# Patient Record
Sex: MALE | Race: WHITE
[De-identification: names, ages, dates, MRNs, and addresses within clinical notes are randomized per-mention and may not be internally consistent; named-entity substitution may affect disease eponyms.]

---

## 2021-09-19 ENCOUNTER — HOSPITAL ENCOUNTER (INPATIENT)
Dept: HOSPITAL 56 - MW.ED | Age: 66
LOS: 3 days | Discharge: HOME | DRG: 195 | End: 2021-09-22
Attending: INTERNAL MEDICINE | Admitting: INTERNAL MEDICINE
Payer: COMMERCIAL

## 2021-09-19 DIAGNOSIS — Z90.5: ICD-10-CM

## 2021-09-19 DIAGNOSIS — Z23: ICD-10-CM

## 2021-09-19 DIAGNOSIS — M54.5: ICD-10-CM

## 2021-09-19 DIAGNOSIS — Z79.899: ICD-10-CM

## 2021-09-19 DIAGNOSIS — M54.6: ICD-10-CM

## 2021-09-19 DIAGNOSIS — Z90.49: ICD-10-CM

## 2021-09-19 DIAGNOSIS — Z20.822: ICD-10-CM

## 2021-09-19 DIAGNOSIS — Z96.659: ICD-10-CM

## 2021-09-19 DIAGNOSIS — E11.9: ICD-10-CM

## 2021-09-19 DIAGNOSIS — J18.9: Primary | ICD-10-CM

## 2021-09-19 DIAGNOSIS — N28.9: ICD-10-CM

## 2021-09-19 DIAGNOSIS — I10: ICD-10-CM

## 2021-09-19 DIAGNOSIS — I48.0: ICD-10-CM

## 2021-09-19 LAB
BUN SERPL-MCNC: 34 MG/DL (ref 7–18)
CHLORIDE SERPL-SCNC: 96 MMOL/L (ref 98–107)
CO2 SERPL-SCNC: 20.1 MMOL/L (ref 21–32)
GLUCOSE SERPL-MCNC: 244 MG/DL (ref 74–106)
LIPASE SERPL-CCNC: 74 U/L (ref 73–393)
POTASSIUM SERPL-SCNC: 5.5 MMOL/L (ref 3.5–5.1)
SODIUM SERPL-SCNC: 131 MMOL/L (ref 136–148)

## 2021-09-19 PROCEDURE — G0009 ADMIN PNEUMOCOCCAL VACCINE: HCPCS

## 2021-09-19 PROCEDURE — U0002 COVID-19 LAB TEST NON-CDC: HCPCS

## 2021-09-19 RX ADMIN — DEXTROSE SCH UNITS: 10 SOLUTION INTRAVENOUS at 20:44

## 2021-09-19 NOTE — CT
Indication:



 Elevated white count. 



Technique:



 Noncontrast CT abdomen and pelvis 



Comparison:



 No comparison 



Findings:



 Heart size normal. No pericardial effusion. No pericardial effusion. Left 

basilar airspace consolidation. 



Small effusion. Unenhanced liver gallbladder pancreas adrenal glands are 

unremarkable. Spleen unremarkable no abdominal aortic aneurysm. Right 

nephrectomy. Left kidney unremarkable 



Prostate gland unremarkable. Urinary bladder decompressed with wall 

thickening. Abundant stool in the colon 



No obstruction 



Small fat containing inguinal hernias. 



No suspicious bony lesions. Old left posterior rib fracture 



Impression:



 1. Left basilar dense airspace consolidation may represent pneumonia. 



2. No acute findings in the abdomen or pelvis.



Please note that all CT scans at this facility use dose modulation, 

iterative reconstruction, and/or weight-based dosing when appropriate to 

reduce radiation dose to as low as reasonably achievable.



Dictated by Jessica Morales MD @ 9/19/2021 3:33:42 PM



(Electronically Signed)

## 2021-09-19 NOTE — CR
INDICATION:



Cough. Bilateral rhonchi.



TECHNIQUE:



AP portable chest.



COMPARISON:



None.



FINDINGS:



Clear lungs. Normal heart size and pulmonary vascularity. Old posterior 

left rib fractures.



IMPRESSION:



No acute cardiopulmonary process identified.



Dictated by Josesito Castro MD @ 9/19/2021 12:45:05 PM



(Electronically Signed)

## 2021-09-19 NOTE — EDM.PDOC
ED HPI GENERAL MEDICAL PROBLEM





- General


Chief Complaint: Respiratory Problem


Stated Complaint: COUGHING, CHEST CONGESTION


Time Seen by Provider: 09/19/21 11:46


Source of Information: Reports: Patient


History Limitations: Reports: No Limitations





- History of Present Illness


INITIAL COMMENTS - FREE TEXT/NARRATIVE: 





Patient is a 66-year-old male history of diabetes who presents today for what he

describes flulike symptoms.  He states he has diffuse body aches dry cough and 

fatigue.  Symptoms have been present for 2 days.  He has not take any medication

for this at home.  Denies any chest pain abdominal pain states he still 

tolerating p.o.


  ** Bilateral Chest


Pain Score (Numeric/FACES): 4





- Related Data


                                    Allergies











Allergy/AdvReac Type Severity Reaction Status Date / Time


 


No Known Allergies Allergy   Verified 09/19/21 11:48











Home Meds: 


                                    Home Meds





Buprenorphine 15 mg DAILY 09/19/21 [History]


Cholecalciferol (Vitamin D3) [Vitamin D3] 1 dose PO DAILY 09/19/21 [History]


Citalopram [Citalopram HBr] 20 mg PO DAILY 09/19/21 [History]


Dulaglutide [Trulicity] 3 mg PO DAILY 09/19/21 [History]


Fluticasone Propionate [24 Hour Allergy] 1 dose PO DAILY 09/19/21 [History]


Lactobacillus Acidophilus [Acidophilus Probiotic] 1 dose PO DAILY 09/19/21 

[History]


Melatonin 10 mg PO DAILY 09/19/21 [History]


Metoprolol Succinate 50 mg PO DAILY 09/19/21 [History]


Omeprazole Magnesium 20 mg PO DAILY 09/19/21 [History]


Ubidecarenone [Coenzyme Q-10] 1 dose PO DAILY 09/19/21 [History]


atorvaSTATin [Lipitor] 20 mg PO DAILY 09/19/21 [History]


glipiZIDE [Glucotrol XL] 10 mg PO DAILY 09/19/21 [History]


lisinopriL [Lisinopril] 2.5 mg PO DAILY 09/19/21 [History]


traZODone 50 mg PO DAILY 09/19/21 [History]











ED ROS GENERAL





- Review of Systems


Review Of Systems: See Below


Constitutional: Reports: Malaise, Fatigue


HEENT: Reports: No Symptoms


Respiratory: Reports: Cough


Cardiovascular: Reports: No Symptoms


Endocrine: Reports: No Symptoms


GI/Abdominal: Reports: No Symptoms


: Reports: No Symptoms


Musculoskeletal: Reports: No Symptoms


Skin: Reports: No Symptoms


Neurological: Reports: No Symptoms


Psychiatric: Reports: No Symptoms


Hematologic/Lymphatic: Reports: No Symptoms


Immunologic: Reports: No Symptoms





ED EXAM, GENERAL





- Physical Exam


Exam: See Below


Exam Limited By: No Limitations


General Appearance: Alert, WD/WN, No Apparent Distress


Respiratory/Chest: No Respiratory Distress, Rhonchi


Cardiovascular: Normal Peripheral Pulses, Regular Rate, Rhythm


GI/Abdominal: Normal Bowel Sounds, Soft, Non-Tender


Back Exam: Normal Inspection


Extremities: Normal Inspection, Normal Range of Motion


Neurological: Alert, Oriented, Normal Cognition, Normal Gait





Course





- Vital Signs


Last Recorded V/S: 


                                Last Vital Signs











Temp  97.8 F   09/19/21 11:59


 


Pulse  84   09/19/21 14:21


 


Resp  16   09/19/21 14:21


 


BP  137/90   09/19/21 14:21


 


Pulse Ox  97   09/19/21 14:21














- Orders/Labs/Meds


Orders: 


                               Active Orders 24 hr











 Category Date Time Status


 


 Patient Status [ADT] Routine ADT  09/19/21 15:48 Ordered


 


 CULTURE BLOOD [BC] Stat Lab  09/19/21 15:39 Ordered


 


 CULTURE BLOOD [BC] Stat Lab  09/19/21 15:39 Ordered


 


 LACTIC ACID SEPSIS W/ REFLEX [LACTATE SEPSIS W/ REFLEX] Lab  09/19/21 15:22 

Received





 [CHEM] Routine   


 


 Azithromycin [Zithromax] 500 mg Med  09/19/21 15:45 Active





 Sodium Chloride 0.9% [Normal Saline (AdvBag)] 250 ml   





 IV ONETIME   


 


 Sodium Chloride 0.9% [Normal Saline] 1,000 ml Med  09/19/21 13:30 Active





 IV ASDIRECTED   


 


 Blood Culture x2 Reflex Set [OM.PC] Stat Oth  09/19/21 15:39 Ordered








                                Medication Orders





Sodium Chloride (Normal Saline)  1,000 mls @ 1,000 mls/hr IV ASDIRECTED KYLE


   Last Admin: 09/19/21 13:40  Dose: 1,000 mls/hr


   Documented by: JOSE


Azithromycin 500 mg/ Sodium (Chloride)  250 mls @ 250 mls/hr IV ONETIME KYLE








Labs: 


                                Laboratory Tests











  09/19/21 09/19/21 09/19/21 Range/Units





  12:11 12:11 12:11 


 


WBC  36.64 H    (4.0-11.0)  K/uL


 


RBC  4.63    (4.50-5.90)  M/uL


 


Hgb  14.6    (13.0-17.0)  g/dL


 


Hct  41.0    (38.0-50.0)  %


 


MCV  88.6    (80.0-98.0)  fL


 


MCH  31.5    (27.0-32.0)  pg


 


MCHC  35.6    (31.0-37.0)  g/dL


 


RDW Std Deviation  43.6    (28.0-62.0)  fl


 


RDW Coeff of Arpit  14    (11.0-15.0)  %


 


Plt Count  309    (150-400)  K/uL


 


MPV  9.30    (7.40-12.00)  fL


 


Add Manual Diff  YES    


 


Neutrophils % (Manual)  74    (48.0-80.0)  %


 


Band Neutrophils %  14    %


 


Lymphocytes % (Manual)  7 L    (16.0-40.0)  %


 


Monocytes % (Manual)  5    (0.0-15.0)  %


 


Nucleated RBC %  0.1    /100WBC


 


Absolute Seg Neuts  27.1 H    (1.4-5.7)  


 


Band Neutrophils #  5.1    


 


Lymphocytes # (Manual)  2.6 H    (0.6-2.4)  


 


Monocytes # (Manual)  1.8 H    (0.0-0.8)  


 


Nucleated RBCs #  0    K/uL


 


INR    1.11  


 


APTT    27.2  (18.6-31.3)  SEC


 


Fibrinogen    430 H  (215-411)  mg/dL


 


Sodium   131 L   (136-148)  mmol/L


 


Potassium   5.5 H   (3.5-5.1)  mmol/L


 


Chloride   96 L   ()  mmol/L


 


Carbon Dioxide   20.1 L   (21.0-32.0)  mmol/L


 


BUN   34 H   (7.0-18.0)  mg/dL


 


Creatinine   3.0 H   (0.8-1.3)  mg/dL


 


Est Cr Clr Drug Dosing   25.35   mL/min


 


Estimated GFR (MDRD)   21.1   ml/min


 


Glucose   244 H   ()  mg/dL


 


Calcium   9.3   (8.5-10.1)  mg/dL


 


Total Bilirubin   1.1 H   (0.2-1.0)  mg/dL


 


AST   9 L   (15-37)  IU/L


 


ALT   27   (14-63)  IU/L


 


Alkaline Phosphatase   69   ()  U/L


 


Troponin I   < 0.050   (0.000-0.056)  ng/mL


 


Total Protein   7.7   (6.4-8.2)  g/dL


 


Albumin   4.0   (3.4-5.0)  g/dL


 


Globulin   3.7   (2.6-4.0)  g/dL


 


Albumin/Globulin Ratio   1.1   (0.9-1.6)  


 


Lipase   74   ()  U/L


 


Urine Color     


 


Urine Appearance     


 


Urine pH     (5.0-8.0)  


 


Ur Specific Gravity     (1.001-1.035)  


 


Urine Protein     (NEGATIVE)  mg/dL


 


Urine Glucose (UA)     (NEGATIVE)  mg/dL


 


Urine Ketones     (NEGATIVE)  mg/dL


 


Urine Occult Blood     (NEGATIVE)  


 


Urine Nitrite     (NEGATIVE)  


 


Urine Bilirubin     (NEGATIVE)  


 


Urine Urobilinogen     (<2.0)  EU/dL


 


Ur Leukocyte Esterase     (NEGATIVE)  


 


U Hyaline Cast (Auto)     (0-2/LPF)  


 


Urine RBC     (0-2/HPF)  


 


Urine WBC     (0-5/HPF)  


 


Ur Epithelial Cells     (NONE-FEW)  


 


Amorphous Sediment     (NEGATIVE)  


 


Urine Bacteria     (NEGATIVE)  


 


Urine Mucus     (NONE-MOD)  


 


SARS-CoV-2 RNA (GOOD)     (NEGATIVE)  














  09/19/21 09/19/21 Range/Units





  13:40 14:45 


 


WBC    (4.0-11.0)  K/uL


 


RBC    (4.50-5.90)  M/uL


 


Hgb    (13.0-17.0)  g/dL


 


Hct    (38.0-50.0)  %


 


MCV    (80.0-98.0)  fL


 


MCH    (27.0-32.0)  pg


 


MCHC    (31.0-37.0)  g/dL


 


RDW Std Deviation    (28.0-62.0)  fl


 


RDW Coeff of Arpit    (11.0-15.0)  %


 


Plt Count    (150-400)  K/uL


 


MPV    (7.40-12.00)  fL


 


Add Manual Diff    


 


Neutrophils % (Manual)    (48.0-80.0)  %


 


Band Neutrophils %    %


 


Lymphocytes % (Manual)    (16.0-40.0)  %


 


Monocytes % (Manual)    (0.0-15.0)  %


 


Nucleated RBC %    /100WBC


 


Absolute Seg Neuts    (1.4-5.7)  


 


Band Neutrophils #    


 


Lymphocytes # (Manual)    (0.6-2.4)  


 


Monocytes # (Manual)    (0.0-0.8)  


 


Nucleated RBCs #    K/uL


 


INR    


 


APTT    (18.6-31.3)  SEC


 


Fibrinogen    (215-411)  mg/dL


 


Sodium    (136-148)  mmol/L


 


Potassium    (3.5-5.1)  mmol/L


 


Chloride    ()  mmol/L


 


Carbon Dioxide    (21.0-32.0)  mmol/L


 


BUN    (7.0-18.0)  mg/dL


 


Creatinine    (0.8-1.3)  mg/dL


 


Est Cr Clr Drug Dosing    mL/min


 


Estimated GFR (MDRD)    ml/min


 


Glucose    ()  mg/dL


 


Calcium    (8.5-10.1)  mg/dL


 


Total Bilirubin    (0.2-1.0)  mg/dL


 


AST    (15-37)  IU/L


 


ALT    (14-63)  IU/L


 


Alkaline Phosphatase    ()  U/L


 


Troponin I    (0.000-0.056)  ng/mL


 


Total Protein    (6.4-8.2)  g/dL


 


Albumin    (3.4-5.0)  g/dL


 


Globulin    (2.6-4.0)  g/dL


 


Albumin/Globulin Ratio    (0.9-1.6)  


 


Lipase    ()  U/L


 


Urine Color   YELLOW  


 


Urine Appearance   CLEAR  


 


Urine pH   5.5  (5.0-8.0)  


 


Ur Specific Gravity   1.025  (1.001-1.035)  


 


Urine Protein   30 H  (NEGATIVE)  mg/dL


 


Urine Glucose (UA)   250 H  (NEGATIVE)  mg/dL


 


Urine Ketones   TRACE H  (NEGATIVE)  mg/dL


 


Urine Occult Blood   SMALL H  (NEGATIVE)  


 


Urine Nitrite   NEGATIVE  (NEGATIVE)  


 


Urine Bilirubin   NEGATIVE  (NEGATIVE)  


 


Urine Urobilinogen   0.2  (<2.0)  EU/dL


 


Ur Leukocyte Esterase   NEGATIVE  (NEGATIVE)  


 


U Hyaline Cast (Auto)   1-3  (0-2/LPF)  


 


Urine RBC   2-4  (0-2/HPF)  


 


Urine WBC   0-2  (0-5/HPF)  


 


Ur Epithelial Cells   FEW  (NONE-FEW)  


 


Amorphous Sediment   LIGHT  (NEGATIVE)  


 


Urine Bacteria   1+ H  (NEGATIVE)  


 


Urine Mucus   LIGHT  (NONE-MOD)  


 


SARS-CoV-2 RNA (GOOD)  NEGATIVE   (NEGATIVE)  











Meds: 


Medications











Generic Name Dose Route Start Last Admin





  Trade Name Freq  PRN Reason Stop Dose Admin


 


Sodium Chloride  1,000 mls @ 1,000 mls/hr  09/19/21 13:30  09/19/21 13:40





  Normal Saline  IV   1,000 mls/hr





  ASDIRECTED KYLE   Administration


 


Azithromycin 500 mg/ Sodium  250 mls @ 250 mls/hr  09/19/21 15:45 





  Chloride  IV  





  ONETIME KYLE  














Discontinued Medications














Generic Name Dose Route Start Last Admin





  Trade Name Humphrey  PRN Reason Stop Dose Admin


 


Ceftriaxone Sodium  1 gm  09/19/21 15:38 





  Ceftriaxone 1 Gm Vial  IVPUSH  09/19/21 15:39 





  ONETIME ONE  


 


Sterile Water  Confirm  09/19/21 15:43 





  Sterile Water For Injection  Administered  09/19/21 15:44 





  Dose  





  20 mls @ as directed  





  .ROUTE  





  .STK-MED ONE  


 


Sterile Water  20 ml  09/19/21 15:44 





  Water For Injection, Sterile 20 Ml Sdv  INJECT  09/19/21 15:45 





  ONETIME ONE  














- Re-Assessments/Exams


Free Text/Narrative Re-Assessment/Exam: 





09/19/21 15:49


Patient CT shows possible pneumonia as noted base.  Patient Covid negative but 

has elevated white count will be started on antibiotics and admitted to the 

hospital.





Departure





- Departure


Time of Disposition: 15:49


Disposition: Admitted As Inpatient 67


Condition: Good


Clinical Impression: 


 Pneumonia








- Discharge Information


*PRESCRIPTION DRUG MONITORING PROGRAM REVIEWED*: Not Applicable


*COPY OF PRESCRIPTION DRUG MONITORING REPORT IN PATIENT AMANUEL: Not Applicable


Referrals: 


PCP,Not In Area [Primary Care Provider] - 


Forms:  ED Department Discharge





Sepsis Event Note (ED)





- Focused Exam


Vital Signs: 


                                   Vital Signs











  Temp Pulse Resp BP Pulse Ox


 


 09/19/21 14:21   84  16  137/90  97


 


 09/19/21 12:50   89  16  114/86  96


 


 09/19/21 11:59  97.8 F  91  17  130/88  94 L














- My Orders


Last 24 Hours: 


My Active Orders





09/19/21 13:30


Sodium Chloride 0.9% [Normal Saline] 1,000 ml IV ASDIRECTED 





09/19/21 15:39


CULTURE BLOOD [BC] Stat 


CULTURE BLOOD [BC] Stat 


Blood Culture x2 Reflex Set [OM.PC] Stat 





09/19/21 15:45


Azithromycin [Zithromax] 500 mg   Sodium Chloride 0.9% [Normal Saline (AdvBag)] 

250 ml IV ONETIME 





09/19/21 15:48


Patient Status [ADT] Routine 














- Assessment/Plan


Last 24 Hours: 


My Active Orders





09/19/21 13:30


Sodium Chloride 0.9% [Normal Saline] 1,000 ml IV ASDIRECTED 





09/19/21 15:39


CULTURE BLOOD [BC] Stat 


CULTURE BLOOD [BC] Stat 


Blood Culture x2 Reflex Set [OM.PC] Stat 





09/19/21 15:45


Azithromycin [Zithromax] 500 mg   Sodium Chloride 0.9% [Normal Saline (AdvBag)] 

250 ml IV ONETIME 





09/19/21 15:48


Patient Status [ADT] Routine 











Plan: 





Patient is a 66-year-old male who presents today for flulike symptoms body aches

 fatigue and cough.  Patient has some rhonchi but is satting 95% on room air.  

We will obtain labs x-ray and reassess.

## 2021-09-19 NOTE — PCM.HP.2
H&P History of Present Illness





- General


Date of Service: 09/19/21


Admit Problem/Dx: 


                           Admission Diagnosis/Problem





Admission Diagnosis/Problem      Pneumonia











- History of Present Illness


Initial Comments - Free Text/Narative: 





65 yo male with pmh of DM, right nephrectomy, and HTN, who presented to the ED 

with one week of cold symptoms and several days worth of productive cough, 

shortness of breath, fever, chills and pain in the left upper abdomen.  Patient 

was noted to have a WBC of 36,000, Creatinine of 3.0 and abdominal CT scan that 

report dense left basilar consolidation.


  ** Bilateral Chest


Pain Score (Numeric/FACES): 4





  ** Head


Pain Score (Numeric/FACES): 8





- Related Data


Allergies/Adverse Reactions: 


                                    Allergies











Allergy/AdvReac Type Severity Reaction Status Date / Time


 


No Known Allergies Allergy   Verified 09/19/21 16:57











Home Medications: 


                                    Home Meds





Cholecalciferol (Vitamin D3) [Vitamin D3] 1 dose PO DAILY 09/19/21 [History]


Citalopram [Citalopram HBr] 20 mg PO DAILY 09/19/21 [History]


Dulaglutide [Trulicity] 3 mg PO DAILY 09/19/21 [History]


Lactobacillus Acidophilus [Acidophilus Probiotic] 1 dose PO DAILY 09/19/21 

[History]


Melatonin 10 mg PO BEDTIME 09/19/21 [History]


Metoprolol Succinate 50 mg PO DAILY 09/19/21 [History]


Omeprazole Magnesium 20 mg PO DAILY 09/19/21 [History]


atorvaSTATin [Lipitor] 20 mg PO DAILY 09/19/21 [History]


glipiZIDE [Glucotrol XL] 10 mg PO DAILY 09/19/21 [History]


lisinopriL [Lisinopril] 2.5 mg PO DAILY 09/19/21 [History]


traZODone 50 mg PO BEDTIME 09/19/21 [History]











Past Medical History


Cardiovascular History: Reports: Hypertension


Other Genitourinary History: Patient had right kidney removed.


Endocrine/Metabolic History: Reports: Diabetes, Type II





- Past Surgical History


GI Surgical History: Reports: Appendectomy


Musculoskeletal Surgical History: Reports: Knee Replacement, Shoulder Surgery





Social & Family History





- Tobacco Use


Tobacco Use Status *Q: Never Tobacco User





- Recreational Drug Use


Recreational Drug Use: No





H&P Review of Systems





- Review of Systems:


Review Of Systems: Comprehensive ROS is negative, except as noted in HPI.





Exam





- Exam


Exam: See Below





- Vital Signs


Vital Signs: 


                                Last Vital Signs











Temp  36.6 C   09/19/21 11:59


 


Pulse  83   09/19/21 16:00


 


Resp  17   09/19/21 16:00


 


BP  132/89   09/19/21 16:00


 


Pulse Ox  96   09/19/21 16:00











Weight: 108.862 kg





- Exam


General: Alert, Oriented


HEENT: Mucosa Moist & Pink


Lungs: Clear to Auscultation, Normal Respiratory Effort


Cardiovascular: Regular Rate, Regular Rhythm


GI/Abdominal Exam: Normal Bowel Sounds, Soft, Non-Tender


Extremities: Non-Tender, No Pedal Edema


Skin: Warm, Dry, Intact


Neurological: Cranial Nerves Intact





- Patient Data


Lab Results Last 24 hrs: 


                         Laboratory Results - last 24 hr











  09/19/21 09/19/21 09/19/21 Range/Units





  12:11 12:11 12:11 


 


WBC  36.64 H    (4.0-11.0)  K/uL


 


RBC  4.63    (4.50-5.90)  M/uL


 


Hgb  14.6    (13.0-17.0)  g/dL


 


Hct  41.0    (38.0-50.0)  %


 


MCV  88.6    (80.0-98.0)  fL


 


MCH  31.5    (27.0-32.0)  pg


 


MCHC  35.6    (31.0-37.0)  g/dL


 


RDW Std Deviation  43.6    (28.0-62.0)  fl


 


RDW Coeff of Arpit  14    (11.0-15.0)  %


 


Plt Count  309    (150-400)  K/uL


 


MPV  9.30    (7.40-12.00)  fL


 


Add Manual Diff  YES    


 


Neutrophils % (Manual)  74    (48.0-80.0)  %


 


Band Neutrophils %  14    %


 


Lymphocytes % (Manual)  7 L    (16.0-40.0)  %


 


Monocytes % (Manual)  5    (0.0-15.0)  %


 


Nucleated RBC %  0.1    /100WBC


 


Absolute Seg Neuts  27.1 H    (1.4-5.7)  


 


Band Neutrophils #  5.1    


 


Lymphocytes # (Manual)  2.6 H    (0.6-2.4)  


 


Monocytes # (Manual)  1.8 H    (0.0-0.8)  


 


Nucleated RBCs #  0    K/uL


 


INR    1.11  


 


APTT    27.2  (18.6-31.3)  SEC


 


Fibrinogen    430 H  (215-411)  mg/dL


 


Sodium   131 L   (136-148)  mmol/L


 


Potassium   5.5 H   (3.5-5.1)  mmol/L


 


Chloride   96 L   ()  mmol/L


 


Carbon Dioxide   20.1 L   (21.0-32.0)  mmol/L


 


BUN   34 H   (7.0-18.0)  mg/dL


 


Creatinine   3.0 H   (0.8-1.3)  mg/dL


 


Est Cr Clr Drug Dosing   25.35   mL/min


 


Estimated GFR (MDRD)   21.1   ml/min


 


Glucose   244 H   ()  mg/dL


 


Lactic Acid     (0.4-2.0)  mmol/L


 


Calcium   9.3   (8.5-10.1)  mg/dL


 


Total Bilirubin   1.1 H   (0.2-1.0)  mg/dL


 


AST   9 L   (15-37)  IU/L


 


ALT   27   (14-63)  IU/L


 


Alkaline Phosphatase   69   ()  U/L


 


Troponin I   < 0.050   (0.000-0.056)  ng/mL


 


Total Protein   7.7   (6.4-8.2)  g/dL


 


Albumin   4.0   (3.4-5.0)  g/dL


 


Globulin   3.7   (2.6-4.0)  g/dL


 


Albumin/Globulin Ratio   1.1   (0.9-1.6)  


 


Lipase   74   ()  U/L


 


Urine Color     


 


Urine Appearance     


 


Urine pH     (5.0-8.0)  


 


Ur Specific Gravity     (1.001-1.035)  


 


Urine Protein     (NEGATIVE)  mg/dL


 


Urine Glucose (UA)     (NEGATIVE)  mg/dL


 


Urine Ketones     (NEGATIVE)  mg/dL


 


Urine Occult Blood     (NEGATIVE)  


 


Urine Nitrite     (NEGATIVE)  


 


Urine Bilirubin     (NEGATIVE)  


 


Urine Urobilinogen     (<2.0)  EU/dL


 


Ur Leukocyte Esterase     (NEGATIVE)  


 


U Hyaline Cast (Auto)     (0-2/LPF)  


 


Urine RBC     (0-2/HPF)  


 


Urine WBC     (0-5/HPF)  


 


Ur Epithelial Cells     (NONE-FEW)  


 


Amorphous Sediment     (NEGATIVE)  


 


Urine Bacteria     (NEGATIVE)  


 


Urine Mucus     (NONE-MOD)  


 


Acetaminophen     ug/mL


 


SARS-CoV-2 RNA (GOOD)     (NEGATIVE)  














  09/19/21 09/19/21 09/19/21 Range/Units





  12:11 13:40 14:45 


 


WBC     (4.0-11.0)  K/uL


 


RBC     (4.50-5.90)  M/uL


 


Hgb     (13.0-17.0)  g/dL


 


Hct     (38.0-50.0)  %


 


MCV     (80.0-98.0)  fL


 


MCH     (27.0-32.0)  pg


 


MCHC     (31.0-37.0)  g/dL


 


RDW Std Deviation     (28.0-62.0)  fl


 


RDW Coeff of Arpit     (11.0-15.0)  %


 


Plt Count     (150-400)  K/uL


 


MPV     (7.40-12.00)  fL


 


Add Manual Diff     


 


Neutrophils % (Manual)     (48.0-80.0)  %


 


Band Neutrophils %     %


 


Lymphocytes % (Manual)     (16.0-40.0)  %


 


Monocytes % (Manual)     (0.0-15.0)  %


 


Nucleated RBC %     /100WBC


 


Absolute Seg Neuts     (1.4-5.7)  


 


Band Neutrophils #     


 


Lymphocytes # (Manual)     (0.6-2.4)  


 


Monocytes # (Manual)     (0.0-0.8)  


 


Nucleated RBCs #     K/uL


 


INR     


 


APTT     (18.6-31.3)  SEC


 


Fibrinogen     (215-411)  mg/dL


 


Sodium     (136-148)  mmol/L


 


Potassium     (3.5-5.1)  mmol/L


 


Chloride     ()  mmol/L


 


Carbon Dioxide     (21.0-32.0)  mmol/L


 


BUN     (7.0-18.0)  mg/dL


 


Creatinine     (0.8-1.3)  mg/dL


 


Est Cr Clr Drug Dosing     mL/min


 


Estimated GFR (MDRD)     ml/min


 


Glucose     ()  mg/dL


 


Lactic Acid     (0.4-2.0)  mmol/L


 


Calcium     (8.5-10.1)  mg/dL


 


Total Bilirubin     (0.2-1.0)  mg/dL


 


AST     (15-37)  IU/L


 


ALT     (14-63)  IU/L


 


Alkaline Phosphatase     ()  U/L


 


Troponin I     (0.000-0.056)  ng/mL


 


Total Protein     (6.4-8.2)  g/dL


 


Albumin     (3.4-5.0)  g/dL


 


Globulin     (2.6-4.0)  g/dL


 


Albumin/Globulin Ratio     (0.9-1.6)  


 


Lipase     ()  U/L


 


Urine Color    YELLOW  


 


Urine Appearance    CLEAR  


 


Urine pH    5.5  (5.0-8.0)  


 


Ur Specific Gravity    1.025  (1.001-1.035)  


 


Urine Protein    30 H  (NEGATIVE)  mg/dL


 


Urine Glucose (UA)    250 H  (NEGATIVE)  mg/dL


 


Urine Ketones    TRACE H  (NEGATIVE)  mg/dL


 


Urine Occult Blood    SMALL H  (NEGATIVE)  


 


Urine Nitrite    NEGATIVE  (NEGATIVE)  


 


Urine Bilirubin    NEGATIVE  (NEGATIVE)  


 


Urine Urobilinogen    0.2  (<2.0)  EU/dL


 


Ur Leukocyte Esterase    NEGATIVE  (NEGATIVE)  


 


U Hyaline Cast (Auto)    1-3  (0-2/LPF)  


 


Urine RBC    2-4  (0-2/HPF)  


 


Urine WBC    0-2  (0-5/HPF)  


 


Ur Epithelial Cells    FEW  (NONE-FEW)  


 


Amorphous Sediment    LIGHT  (NEGATIVE)  


 


Urine Bacteria    1+ H  (NEGATIVE)  


 


Urine Mucus    LIGHT  (NONE-MOD)  


 


Acetaminophen  <2.0    ug/mL


 


SARS-CoV-2 RNA (GOOD)   NEGATIVE   (NEGATIVE)  














  09/19/21 Range/Units





  15:22 


 


WBC   (4.0-11.0)  K/uL


 


RBC   (4.50-5.90)  M/uL


 


Hgb   (13.0-17.0)  g/dL


 


Hct   (38.0-50.0)  %


 


MCV   (80.0-98.0)  fL


 


MCH   (27.0-32.0)  pg


 


MCHC   (31.0-37.0)  g/dL


 


RDW Std Deviation   (28.0-62.0)  fl


 


RDW Coeff of Arpit   (11.0-15.0)  %


 


Plt Count   (150-400)  K/uL


 


MPV   (7.40-12.00)  fL


 


Add Manual Diff   


 


Neutrophils % (Manual)   (48.0-80.0)  %


 


Band Neutrophils %   %


 


Lymphocytes % (Manual)   (16.0-40.0)  %


 


Monocytes % (Manual)   (0.0-15.0)  %


 


Nucleated RBC %   /100WBC


 


Absolute Seg Neuts   (1.4-5.7)  


 


Band Neutrophils #   


 


Lymphocytes # (Manual)   (0.6-2.4)  


 


Monocytes # (Manual)   (0.0-0.8)  


 


Nucleated RBCs #   K/uL


 


INR   


 


APTT   (18.6-31.3)  SEC


 


Fibrinogen   (215-411)  mg/dL


 


Sodium   (136-148)  mmol/L


 


Potassium   (3.5-5.1)  mmol/L


 


Chloride   ()  mmol/L


 


Carbon Dioxide   (21.0-32.0)  mmol/L


 


BUN   (7.0-18.0)  mg/dL


 


Creatinine   (0.8-1.3)  mg/dL


 


Est Cr Clr Drug Dosing   mL/min


 


Estimated GFR (MDRD)   ml/min


 


Glucose   ()  mg/dL


 


Lactic Acid  3.4 H*  (0.4-2.0)  mmol/L


 


Calcium   (8.5-10.1)  mg/dL


 


Total Bilirubin   (0.2-1.0)  mg/dL


 


AST   (15-37)  IU/L


 


ALT   (14-63)  IU/L


 


Alkaline Phosphatase   ()  U/L


 


Troponin I   (0.000-0.056)  ng/mL


 


Total Protein   (6.4-8.2)  g/dL


 


Albumin   (3.4-5.0)  g/dL


 


Globulin   (2.6-4.0)  g/dL


 


Albumin/Globulin Ratio   (0.9-1.6)  


 


Lipase   ()  U/L


 


Urine Color   


 


Urine Appearance   


 


Urine pH   (5.0-8.0)  


 


Ur Specific Gravity   (1.001-1.035)  


 


Urine Protein   (NEGATIVE)  mg/dL


 


Urine Glucose (UA)   (NEGATIVE)  mg/dL


 


Urine Ketones   (NEGATIVE)  mg/dL


 


Urine Occult Blood   (NEGATIVE)  


 


Urine Nitrite   (NEGATIVE)  


 


Urine Bilirubin   (NEGATIVE)  


 


Urine Urobilinogen   (<2.0)  EU/dL


 


Ur Leukocyte Esterase   (NEGATIVE)  


 


U Hyaline Cast (Auto)   (0-2/LPF)  


 


Urine RBC   (0-2/HPF)  


 


Urine WBC   (0-5/HPF)  


 


Ur Epithelial Cells   (NONE-FEW)  


 


Amorphous Sediment   (NEGATIVE)  


 


Urine Bacteria   (NEGATIVE)  


 


Urine Mucus   (NONE-MOD)  


 


Acetaminophen   ug/mL


 


SARS-CoV-2 RNA (GOOD)   (NEGATIVE)  











Result Diagrams: 


                                 09/20/21 02:25





                                 09/20/21 02:25


Jostin Results Last 24 hrs: 


                                  Microbiology











 09/19/21 15:55 Anaerobic Blood Culture - Final





 Blood - Venous 














Sepsis Event Note





- Evaluation


Sepsis Screening Result: No Definite Risk





- Focused Exam


Vital Signs: 


                                   Vital Signs











  Temp Pulse Resp BP Pulse Ox


 


 09/19/21 16:00   83  17  132/89  96


 


 09/19/21 15:26   81  16  129/87  96


 


 09/19/21 14:21   84  16  137/90  97


 


 09/19/21 12:50   89  16  114/86  96


 


 09/19/21 11:59  36.6 C  91  17  130/88  94 L














- Problem List


(1) Pneumonia


SNOMED Code(s): 653905085


   ICD Code: J18.9 - PNEUMONIA, UNSPECIFIED ORGANISM   Status: Acute   Current 

Visit: Yes   





(2) Kidney disease


SNOMED Code(s): 24051294


   ICD Code: N28.9 - DISORDER OF KIDNEY AND URETER, UNSPECIFIED   Status: Acute 

  Current Visit: Yes   


Problem List Initiated/Reviewed/Updated: Yes


Orders Last 24hrs: 


                               Active Orders 24 hr











 Category Date Time Status


 


 Patient Status [ADT] Routine ADT  09/19/21 15:48 Active


 


 Antiembolic Devices [RC] PER UNIT ROUTINE Care  09/19/21 16:25 Ordered


 


 Oxygen Therapy [RC] PRN Care  09/19/21 16:25 Ordered


 


 Up ad Kayleigh [RC] ASDIRECTED Care  09/19/21 16:24 Ordered


 


 VTE/DVT Education [RC] PER UNIT ROUTINE Care  09/19/21 16:25 Ordered


 


 Vital Signs [RC] Q4H Care  09/19/21 16:25 Ordered


 


 [employer]abetic [employer] Diet [DIET] Diet  09/19/21 Breakfast Ordered


 


 Retroperitoneal Comp [US] Routine Exams  09/19/21 16:19 Stop Req


 


 CBC WITH AUTO DIFF [HEME] AM Lab  09/20/21 05:11 Ordered


 


 COMPREHENSIVE METABOLIC PN,CMP [CHEM] AM Lab  09/20/21 05:11 Ordered


 


 CULTURE BLOOD [BC] Stat Lab  09/19/21 15:55 Results


 


 CULTURE BLOOD [BC] Stat Lab  09/19/21 16:05 Received


 


 REFLEX LACTIC ACID YES OR NO [CHEM] Routine Lab  09/19/21 16:04 Received


 


 Azithromycin [Zithromax] Med  09/20/21 16:23 Ordered





 500 mg IV Q24H   


 


 Azithromycin [Zithromax] 500 mg Med  09/19/21 15:45 Active





 Sodium Chloride 0.9% [Normal Saline (AdvBag)] 250 ml   





 IV ONETIME   


 


 Heparin Sodium Med  09/19/21 16:30 Ordered





 5,000 units SUBCUT Q12H   


 


 Sodium Chloride 0.9% [Normal Saline] 1,000 ml Med  09/19/21 16:23 Ordered





 IV .Bolus   


 


 Sodium Chloride 0.9% [Normal Saline] 1,000 ml Med  09/19/21 13:30 Active





 IV ASDIRECTED   


 


 cefTRIAXone [Rocephin] 1 gm Med  09/20/21 16:30 Ordered





 Sodium Chloride 0.9% [Normal Saline] 50 ml   





 IV Q24H   


 


 Blood Culture x2 Reflex Set [OM.PC] Stat Ot  09/19/21 15:39 Ordered


 


 Sequential Compression Device [OM.PC] Per Unit Routine Oth  09/19/21 16:25 

Ordered


 


 Resuscitation Status Routine Resus Stat  09/19/21 16:24 Ordered








                                Medication Orders





Azithromycin (Azithromycin 500 Mg Vial)  500 mg IV Q24H KYLE


Heparin Sodium (Porcine) (Heparin Sodium 5,000 Units/Ml Vial)  5,000 units 

SUBCUT Q12H KYLE


Sodium Chloride (Normal Saline)  1,000 mls @ 1,000 mls/hr IV ASDIRECTED [facility]


   Last Admin: 09/19/21 13:40  Dose: 1,000 mls/hr


   Documented by: SCHOLA


Azithromycin 500 mg/ Sodium (Chloride)  250 mls @ 250 mls/hr IV ONETIME KYLE


   Last Admin: 09/19/21 16:06  Dose: 250 mls/hr


   Documented by: SCHOLAC


Sodium Chloride (Normal Saline)  1,000 mls @ 999 mls/hr IV .Bolus ONE


   Stop: 09/19/21 17:23


Ceftriaxone Sodium 1 gm/ (Sodium Chloride)  50 mls @ 100 mls/hr IV Q24H [facility]








Assessment/Plan Comment:: 


65 yo male admitted for community acquried pneumonia


CAP: will treat with rocephin and azithromycin, cultures ordered


suspected ccute on chronic kidney disease:  Will hydrate with IV fluids, no 

obstruction seen on CT.

## 2021-09-20 LAB
BUN SERPL-MCNC: 30 MG/DL (ref 7–18)
CHLORIDE SERPL-SCNC: 101 MMOL/L (ref 98–107)
CO2 SERPL-SCNC: 23.5 MMOL/L (ref 21–32)
GLUCOSE SERPL-MCNC: 150 MG/DL (ref 74–106)
POTASSIUM SERPL-SCNC: 4.2 MMOL/L (ref 3.5–5.1)
SODIUM SERPL-SCNC: 135 MMOL/L (ref 136–148)

## 2021-09-20 RX ADMIN — DEXTROSE SCH UNITS: 10 SOLUTION INTRAVENOUS at 08:27

## 2021-09-20 RX ADMIN — SODIUM CHLORIDE PRN MG: 9 INJECTION, SOLUTION INTRAVENOUS at 21:56

## 2021-09-20 RX ADMIN — SODIUM CHLORIDE PRN MG: 9 INJECTION, SOLUTION INTRAVENOUS at 22:26

## 2021-09-20 RX ADMIN — GUAIFENESIN AND CODEINE PHOSPHATE PRN ML: 100; 10 SOLUTION ORAL at 21:55

## 2021-09-20 RX ADMIN — DEXTROSE SCH UNITS: 10 SOLUTION INTRAVENOUS at 20:50

## 2021-09-20 NOTE — PCM.PN
<Devon Good - Last Filed: 09/20/21 15:33>





- General Info


Date of Service: 09/20/21


Subjective Update: 


The patient is a 66-year-old  male with a significant past medical 

history of hypertension, diabetes mellitus, and a right nephrectomy, who was 

admitted due to community acquired pneumonia.  Upon interview today the patient 

admits his breathing is much better and he is coughing less sputum than 

previously which is clear in color.  However, the patient is complaining of 

thoracic level back pain which is 7 out of 10 in intensity, dull in nature, and 

nonradiating.








- Review of Systems


General: Denies: Fever, Fatigue, Chills


Pulmonary: Reports: Cough.  Denies: Shortness of Breath, Pleuritic Chest Pain


Cardiovascular: Denies: Chest Pain, Palpitations


Gastrointestinal: Denies: Abdominal Pain, Diarrhea, Nausea, Vomiting


Musculoskeletal: Reports: Back Pain





- Patient Data


Vitals - Most Recent: 


                                Last Vital Signs











Temp  98.8 F   09/20/21 07:51


 


Pulse  78   09/20/21 10:51


 


Resp  16   09/20/21 07:51


 


BP  139/90   09/20/21 10:51


 


Pulse Ox  94 L  09/20/21 07:51











Weight - Most Recent: 104.236 kg


I&O - Last 24 Hours: 


                                 Intake & Output











 09/20/21 09/20/21 09/20/21





 06:59 14:59 22:59


 


Intake Total 2650  


 


Output Total 1250  


 


Balance 1400  











Lab Results Last 24 Hours: 


                         Laboratory Results - last 24 hr











  09/19/21 09/19/21 09/19/21 Range/Units





  12:11 15:22 16:54 


 


WBC     (4.0-11.0)  K/uL


 


RBC     (4.50-5.90)  M/uL


 


Hgb     (13.0-17.0)  g/dL


 


Hct     (38.0-50.0)  %


 


MCV     (80.0-98.0)  fL


 


MCH     (27.0-32.0)  pg


 


MCHC     (31.0-37.0)  g/dL


 


RDW Std Deviation     (28.0-62.0)  fl


 


RDW Coeff of Arpit     (11.0-15.0)  %


 


Plt Count     (150-400)  K/uL


 


MPV     (7.40-12.00)  fL


 


Add Manual Diff     


 


Neutrophils % (Manual)     (48.0-80.0)  %


 


Band Neutrophils %     %


 


Lymphocytes % (Manual)     (16.0-40.0)  %


 


Monocytes % (Manual)     (0.0-15.0)  %


 


Metamyelocytes %     %


 


Nucleated RBC %     /100WBC


 


Absolute Seg Neuts     (1.4-5.7)  


 


Band Neutrophils #     


 


Lymphocytes # (Manual)     (0.6-2.4)  


 


Monocytes # (Manual)     (0.0-0.8)  


 


Absolute Metamyelocyte     


 


Nucleated RBCs #     K/uL


 


Sodium     (136-148)  mmol/L


 


Potassium     (3.5-5.1)  mmol/L


 


Chloride     ()  mmol/L


 


Carbon Dioxide     (21.0-32.0)  mmol/L


 


BUN     (7.0-18.0)  mg/dL


 


Creatinine     (0.8-1.3)  mg/dL


 


Est Cr Clr Drug Dosing     mL/min


 


Estimated GFR (MDRD)     ml/min


 


Glucose     ()  mg/dL


 


POC Glucose    184 H  (70-99)  mg/dL


 


Lactic Acid   3.4 H*   (0.4-2.0)  mmol/L


 


Calcium     (8.5-10.1)  mg/dL


 


Total Bilirubin     (0.2-1.0)  mg/dL


 


AST     (15-37)  IU/L


 


ALT     (14-63)  IU/L


 


Alkaline Phosphatase     ()  U/L


 


Total Protein     (6.4-8.2)  g/dL


 


Albumin     (3.4-5.0)  g/dL


 


Globulin     (2.6-4.0)  g/dL


 


Albumin/Globulin Ratio     (0.9-1.6)  


 


Acetaminophen  <2.0    ug/mL














  09/19/21 09/20/21 09/20/21 Range/Units





  20:23 02:25 02:25 


 


WBC   27.16 H   (4.0-11.0)  K/uL


 


RBC   3.94 L   (4.50-5.90)  M/uL


 


Hgb   12.2 L   (13.0-17.0)  g/dL


 


Hct   35.0 L   (38.0-50.0)  %


 


MCV   88.8   (80.0-98.0)  fL


 


MCH   31.0   (27.0-32.0)  pg


 


MCHC   34.9   (31.0-37.0)  g/dL


 


RDW Std Deviation   44.1   (28.0-62.0)  fl


 


RDW Coeff of Arpit   14   (11.0-15.0)  %


 


Plt Count   255   (150-400)  K/uL


 


MPV   9.20   (7.40-12.00)  fL


 


Add Manual Diff   YES   


 


Neutrophils % (Manual)   86 H   (48.0-80.0)  %


 


Band Neutrophils %   7   %


 


Lymphocytes % (Manual)   2 L   (16.0-40.0)  %


 


Monocytes % (Manual)   3   (0.0-15.0)  %


 


Metamyelocytes %   2   %


 


Nucleated RBC %   0.0   /100WBC


 


Absolute Seg Neuts   23.4 H   (1.4-5.7)  


 


Band Neutrophils #   1.9   


 


Lymphocytes # (Manual)   0.5 L   (0.6-2.4)  


 


Monocytes # (Manual)   0.8   (0.0-0.8)  


 


Absolute Metamyelocyte   0.5   


 


Nucleated RBCs #   0   K/uL


 


Sodium    135 L  (136-148)  mmol/L


 


Potassium    4.2  (3.5-5.1)  mmol/L


 


Chloride    101  ()  mmol/L


 


Carbon Dioxide    23.5  (21.0-32.0)  mmol/L


 


BUN    30 H  (7.0-18.0)  mg/dL


 


Creatinine    2.4 H  (0.8-1.3)  mg/dL


 


Est Cr Clr Drug Dosing    31.68  mL/min


 


Estimated GFR (MDRD)    27.3  ml/min


 


Glucose    150 H  ()  mg/dL


 


POC Glucose     (70-99)  mg/dL


 


Lactic Acid  2.4 H*    (0.4-2.0)  mmol/L


 


Calcium    8.7  (8.5-10.1)  mg/dL


 


Total Bilirubin    0.6  (0.2-1.0)  mg/dL


 


AST    12 L  (15-37)  IU/L


 


ALT    21  (14-63)  IU/L


 


Alkaline Phosphatase    59  ()  U/L


 


Total Protein    6.7  (6.4-8.2)  g/dL


 


Albumin    3.2 L  (3.4-5.0)  g/dL


 


Globulin    3.5  (2.6-4.0)  g/dL


 


Albumin/Globulin Ratio    0.9  (0.9-1.6)  


 


Acetaminophen     ug/mL














  09/20/21 09/20/21 Range/Units





  02:25 06:21 


 


WBC    (4.0-11.0)  K/uL


 


RBC    (4.50-5.90)  M/uL


 


Hgb    (13.0-17.0)  g/dL


 


Hct    (38.0-50.0)  %


 


MCV    (80.0-98.0)  fL


 


MCH    (27.0-32.0)  pg


 


MCHC    (31.0-37.0)  g/dL


 


RDW Std Deviation    (28.0-62.0)  fl


 


RDW Coeff of Arpit    (11.0-15.0)  %


 


Plt Count    (150-400)  K/uL


 


MPV    (7.40-12.00)  fL


 


Add Manual Diff    


 


Neutrophils % (Manual)    (48.0-80.0)  %


 


Band Neutrophils %    %


 


Lymphocytes % (Manual)    (16.0-40.0)  %


 


Monocytes % (Manual)    (0.0-15.0)  %


 


Metamyelocytes %    %


 


Nucleated RBC %    /100WBC


 


Absolute Seg Neuts    (1.4-5.7)  


 


Band Neutrophils #    


 


Lymphocytes # (Manual)    (0.6-2.4)  


 


Monocytes # (Manual)    (0.0-0.8)  


 


Absolute Metamyelocyte    


 


Nucleated RBCs #    K/uL


 


Sodium    (136-148)  mmol/L


 


Potassium    (3.5-5.1)  mmol/L


 


Chloride    ()  mmol/L


 


Carbon Dioxide    (21.0-32.0)  mmol/L


 


BUN    (7.0-18.0)  mg/dL


 


Creatinine    (0.8-1.3)  mg/dL


 


Est Cr Clr Drug Dosing    mL/min


 


Estimated GFR (MDRD)    ml/min


 


Glucose    ()  mg/dL


 


POC Glucose   153 H  (70-99)  mg/dL


 


Lactic Acid  1.4   (0.4-2.0)  mmol/L


 


Calcium    (8.5-10.1)  mg/dL


 


Total Bilirubin    (0.2-1.0)  mg/dL


 


AST    (15-37)  IU/L


 


ALT    (14-63)  IU/L


 


Alkaline Phosphatase    ()  U/L


 


Total Protein    (6.4-8.2)  g/dL


 


Albumin    (3.4-5.0)  g/dL


 


Globulin    (2.6-4.0)  g/dL


 


Albumin/Globulin Ratio    (0.9-1.6)  


 


Acetaminophen    ug/mL











Jostin Results Last 24 Hours: 


                                  Microbiology











 09/19/21 15:55 Anaerobic Blood Culture - Final





 Blood - Venous 











Med Orders - Current: 


                               Current Medications





Acetaminophen (Acetaminophen 325 Mg Tab)  325 mg PO Q6H PRN


   PRN Reason: Pain


   Last Admin: 09/19/21 17:30 Dose:  325 mg


   Documented by: 


Acidophilus/Pectin (Acidophilus With Citrus Pectin Tab)  1 tab PO DAILY [facility]


Atorvastatin Calcium (Atorvastatin 20 Mg Tab)  20 mg PO DAILY [facility]


   Last Admin: 09/20/21 10:52 Dose:  20 mg


   Documented by: 


Citalopram Hydrobromide (Citalopram 20 Mg Tab)  20 mg PO DAILY [facility]


   Last Admin: 09/20/21 10:52 Dose:  20 mg


   Documented by: 


Dextrose/Water (50% Dextrose In Water 50 Ml Syringe)  50 ml IVPUSH ASDIRECTED 

PRN


   PRN Reason: Hypoglycemia


Glucagon (Glucagon,Human Recombinant 1 Mg Vial)  1 mg IM ASDIRECTED PRN


   PRN Reason: Hypoglycemia


Heparin Sodium (Porcine) (Heparin Sodium 5,000 Units/Ml Vial)  5,000 units 

SUBCUT Q12H [facility]


   Last Admin: 09/20/21 08:27 Dose:  5,000 units


   Documented by: 


Sodium Chloride (Normal Saline)  1,000 mls @ 1,000 mls/hr IV ASDIRECTED [facility]


   Last Infusion: 09/19/21 14:40 Dose:  Infused


   Documented by: 


Ceftriaxone Sodium/Dextrose (Rocephin In Dextrose,Iso-Osm 1 Gm/50 Ml)  50 mls @ 

100 mls/hr IV Q24H [facility]


   Last Admin: 09/20/21 15:24 Dose:  100 mls/hr


   Documented by: 


Azithromycin 500 mg/ Sodium (Chloride)  250 mls @ 250 mls/hr IV Q24H [facility]


Lactated Ringer's (Ringers, Lactated)  1,000 mls @ 125 mls/hr IV ASDIRECTED [facility]


   Last Admin: 09/20/21 11:51 Dose:  125 mls/hr


   Documented by: 


Insulin Aspart (Insulin Aspart 100 Units/Ml 3 Ml Pen)  0 unit SUBCUT TIDAC [facility]; 

Protocol


   Last Admin: 09/20/21 11:47 Dose:  1 unit


   Documented by: 


Ipratropium Bromide (Ipratropium 0.02% 0.5 Mg/2.5 Ml Neb Soln)  0.5 mg NEB Q6H

RRT PRN


   PRN Reason: wheezing/dyspnea


Melatonin (Melatonin 3 Mg Tab)  9 mg PO BEDTIME [facility]


Metoprolol Succinate (Metoprolol Succinate 50 Mg Tab.Er)  50 mg PO DAILY [facility]


   Last Admin: 09/20/21 10:51 Dose:  50 mg


   Documented by: 


Morphine Sulfate (Morphine 2 Mg/Ml Syringe)  1 mg IVPUSH Q4H PRN


   PRN Reason: Pain


   Last Admin: 09/20/21 15:24 Dose:  1 mg


   Documented by: 


Trazodone HCl (Trazodone 50 Mg Tab)  50 mg PO BEDTIME KYLE





Discontinued Medications





Albuterol/Ipratropium (Albuterol/Ipratropium 3.0-0.5 Mg/3 Ml Neb Soln)  3 ml NEB

Q4HRRT [facility]


   Last Admin: 09/20/21 13:00 Dose:  3 ml


   Documented by: 


Azithromycin (Azithromycin 500 Mg Vial)  500 mg IV Q24H [facility]


Ceftriaxone Sodium (Ceftriaxone 1 Gm Vial)  1 gm IVPUSH ONETIME ONE


   Stop: 09/19/21 15:39


   Last Admin: 09/19/21 16:05 Dose:  1 gm


   Documented by: 


Azithromycin 500 mg/ Sodium (Chloride)  250 mls @ 250 mls/hr IV ONETIME [facility]


   Last Admin: 09/19/21 16:06 Dose:  250 mls/hr


   Documented by: 


Sterile Water (Sterile Water For Injection) Confirm Administered Dose 20 mls @ 

as directed .ROUTE .STK-MED ONE


   Stop: 09/19/21 15:44


   Last Admin: 09/19/21 15:56 Dose:  Not Given


   Documented by: 


Sodium Chloride (Normal Saline)  1,000 mls @ 999 mls/hr IV .Bolus ONE


   Stop: 09/19/21 17:23


   Last Admin: 09/19/21 17:07 Dose:  999 mls/hr


   Documented by: 


Ceftriaxone Sodium 1 gm/ (Sodium Chloride)  50 mls @ 100 mls/hr IV Q24H [facility]


Sodium Chloride (Normal Saline)  1,000 mls @ 125 mls/hr IV ASDIRECTED [facility]


   Last Admin: 09/19/21 17:09 Dose:  125 mls/hr


   Documented by: 


Sodium Chloride (Normal Saline)  500 mls @ 999 mls/hr IV BOLUS ONE


   Stop: 09/19/21 22:43


   Last Admin: 09/19/21 23:02 Dose:  999 mls/hr


   Documented by: 


Pneumococcal Polyvalent Vaccine (Pneumococcal 23-Valent Conjugate Vaccine 0.5 Ml

Syringe)  25 mcg IM .ONCE ONE


   Stop: 09/20/21 09:01


Sterile Water (Water For Injection, Sterile 20 Ml Sdv)  20 ml INJECT ONETIME ONE


   Stop: 09/19/21 15:45


   Last Admin: 09/19/21 16:05 Dose:  20 ml


   Documented by: 











- Exam


General: Alert, Oriented, Cooperative


Lungs: Rhonchi, Wheezing


Cardiovascular: Regular Rate, Regular Rhythm, No Murmurs


GI/Abdominal Exam: Normal Bowel Sounds, Soft, Non-Tender





- Patient Data


Lab Results Last 24 hrs: 


                         Laboratory Results - last 24 hr











  09/19/21 09/19/21 09/19/21 Range/Units





  12:11 15:22 16:54 


 


WBC     (4.0-11.0)  K/uL


 


RBC     (4.50-5.90)  M/uL


 


Hgb     (13.0-17.0)  g/dL


 


Hct     (38.0-50.0)  %


 


MCV     (80.0-98.0)  fL


 


MCH     (27.0-32.0)  pg


 


MCHC     (31.0-37.0)  g/dL


 


RDW Std Deviation     (28.0-62.0)  fl


 


RDW Coeff of Arpit     (11.0-15.0)  %


 


Plt Count     (150-400)  K/uL


 


MPV     (7.40-12.00)  fL


 


Add Manual Diff     


 


Neutrophils % (Manual)     (48.0-80.0)  %


 


Band Neutrophils %     %


 


Lymphocytes % (Manual)     (16.0-40.0)  %


 


Monocytes % (Manual)     (0.0-15.0)  %


 


Metamyelocytes %     %


 


Nucleated RBC %     /100WBC


 


Absolute Seg Neuts     (1.4-5.7)  


 


Band Neutrophils #     


 


Lymphocytes # (Manual)     (0.6-2.4)  


 


Monocytes # (Manual)     (0.0-0.8)  


 


Absolute Metamyelocyte     


 


Nucleated RBCs #     K/uL


 


Sodium     (136-148)  mmol/L


 


Potassium     (3.5-5.1)  mmol/L


 


Chloride     ()  mmol/L


 


Carbon Dioxide     (21.0-32.0)  mmol/L


 


BUN     (7.0-18.0)  mg/dL


 


Creatinine     (0.8-1.3)  mg/dL


 


Est Cr Clr Drug Dosing     mL/min


 


Estimated GFR (MDRD)     ml/min


 


Glucose     ()  mg/dL


 


POC Glucose    184 H  (70-99)  mg/dL


 


Lactic Acid   3.4 H*   (0.4-2.0)  mmol/L


 


Calcium     (8.5-10.1)  mg/dL


 


Total Bilirubin     (0.2-1.0)  mg/dL


 


AST     (15-37)  IU/L


 


ALT     (14-63)  IU/L


 


Alkaline Phosphatase     ()  U/L


 


Total Protein     (6.4-8.2)  g/dL


 


Albumin     (3.4-5.0)  g/dL


 


Globulin     (2.6-4.0)  g/dL


 


Albumin/Globulin Ratio     (0.9-1.6)  


 


Acetaminophen  <2.0    ug/mL














  09/19/21 09/20/21 09/20/21 Range/Units





  20:23 02:25 02:25 


 


WBC   27.16 H   (4.0-11.0)  K/uL


 


RBC   3.94 L   (4.50-5.90)  M/uL


 


Hgb   12.2 L   (13.0-17.0)  g/dL


 


Hct   35.0 L   (38.0-50.0)  %


 


MCV   88.8   (80.0-98.0)  fL


 


MCH   31.0   (27.0-32.0)  pg


 


MCHC   34.9   (31.0-37.0)  g/dL


 


RDW Std Deviation   44.1   (28.0-62.0)  fl


 


RDW Coeff of Arpit   14   (11.0-15.0)  %


 


Plt Count   255   (150-400)  K/uL


 


MPV   9.20   (7.40-12.00)  fL


 


Add Manual Diff   YES   


 


Neutrophils % (Manual)   86 H   (48.0-80.0)  %


 


Band Neutrophils %   7   %


 


Lymphocytes % (Manual)   2 L   (16.0-40.0)  %


 


Monocytes % (Manual)   3   (0.0-15.0)  %


 


Metamyelocytes %   2   %


 


Nucleated RBC %   0.0   /100WBC


 


Absolute Seg Neuts   23.4 H   (1.4-5.7)  


 


Band Neutrophils #   1.9   


 


Lymphocytes # (Manual)   0.5 L   (0.6-2.4)  


 


Monocytes # (Manual)   0.8   (0.0-0.8)  


 


Absolute Metamyelocyte   0.5   


 


Nucleated RBCs #   0   K/uL


 


Sodium    135 L  (136-148)  mmol/L


 


Potassium    4.2  (3.5-5.1)  mmol/L


 


Chloride    101  ()  mmol/L


 


Carbon Dioxide    23.5  (21.0-32.0)  mmol/L


 


BUN    30 H  (7.0-18.0)  mg/dL


 


Creatinine    2.4 H  (0.8-1.3)  mg/dL


 


Est Cr Clr Drug Dosing    31.68  mL/min


 


Estimated GFR (MDRD)    27.3  ml/min


 


Glucose    150 H  ()  mg/dL


 


POC Glucose     (70-99)  mg/dL


 


Lactic Acid  2.4 H*    (0.4-2.0)  mmol/L


 


Calcium    8.7  (8.5-10.1)  mg/dL


 


Total Bilirubin    0.6  (0.2-1.0)  mg/dL


 


AST    12 L  (15-37)  IU/L


 


ALT    21  (14-63)  IU/L


 


Alkaline Phosphatase    59  ()  U/L


 


Total Protein    6.7  (6.4-8.2)  g/dL


 


Albumin    3.2 L  (3.4-5.0)  g/dL


 


Globulin    3.5  (2.6-4.0)  g/dL


 


Albumin/Globulin Ratio    0.9  (0.9-1.6)  


 


Acetaminophen     ug/mL














  09/20/21 09/20/21 Range/Units





  02:25 06:21 


 


WBC    (4.0-11.0)  K/uL


 


RBC    (4.50-5.90)  M/uL


 


Hgb    (13.0-17.0)  g/dL


 


Hct    (38.0-50.0)  %


 


MCV    (80.0-98.0)  fL


 


MCH    (27.0-32.0)  pg


 


MCHC    (31.0-37.0)  g/dL


 


RDW Std Deviation    (28.0-62.0)  fl


 


RDW Coeff of Arpit    (11.0-15.0)  %


 


Plt Count    (150-400)  K/uL


 


MPV    (7.40-12.00)  fL


 


Add Manual Diff    


 


Neutrophils % (Manual)    (48.0-80.0)  %


 


Band Neutrophils %    %


 


Lymphocytes % (Manual)    (16.0-40.0)  %


 


Monocytes % (Manual)    (0.0-15.0)  %


 


Metamyelocytes %    %


 


Nucleated RBC %    /100WBC


 


Absolute Seg Neuts    (1.4-5.7)  


 


Band Neutrophils #    


 


Lymphocytes # (Manual)    (0.6-2.4)  


 


Monocytes # (Manual)    (0.0-0.8)  


 


Absolute Metamyelocyte    


 


Nucleated RBCs #    K/uL


 


Sodium    (136-148)  mmol/L


 


Potassium    (3.5-5.1)  mmol/L


 


Chloride    ()  mmol/L


 


Carbon Dioxide    (21.0-32.0)  mmol/L


 


BUN    (7.0-18.0)  mg/dL


 


Creatinine    (0.8-1.3)  mg/dL


 


Est Cr Clr Drug Dosing    mL/min


 


Estimated GFR (MDRD)    ml/min


 


Glucose    ()  mg/dL


 


POC Glucose   153 H  (70-99)  mg/dL


 


Lactic Acid  1.4   (0.4-2.0)  mmol/L


 


Calcium    (8.5-10.1)  mg/dL


 


Total Bilirubin    (0.2-1.0)  mg/dL


 


AST    (15-37)  IU/L


 


ALT    (14-63)  IU/L


 


Alkaline Phosphatase    ()  U/L


 


Total Protein    (6.4-8.2)  g/dL


 


Albumin    (3.4-5.0)  g/dL


 


Globulin    (2.6-4.0)  g/dL


 


Albumin/Globulin Ratio    (0.9-1.6)  


 


Acetaminophen    ug/mL











Result Diagrams: 


                                 09/20/21 02:25





                                 09/20/21 02:25


Jostin Results Last 24 hrs: 


                                  Microbiology











 09/19/21 15:55 Anaerobic Blood Culture - Final





 Blood - Venous 














Sepsis Event Note





- Evaluation


Sepsis Screening Result: Possible Sepsis Risk





- Focused Exam


Vital Signs: 


                                   Vital Signs











  Temp Pulse Pulse Resp BP BP Pulse Ox


 


 09/20/21 10:51   78    139/90  


 


 09/20/21 07:51  98.8 F   78  16   139/90  94 L


 


 09/20/21 04:13  99 F   73  17   140/76  96














- Problem List & Annotations


(1) Back pain of thoracolumbar region


SNOMED Code(s): 696778149


   Code(s): M54.5 - LOW BACK PAIN; M54.6 - PAIN IN THORACIC SPINE   Status: A

cute   Current Visit: Yes   





(2) Kidney disease


SNOMED Code(s): 16484560


   Code(s): N28.9 - DISORDER OF KIDNEY AND URETER, UNSPECIFIED   Status: Acute  

Current Visit: Yes   





(3) Pneumonia


SNOMED Code(s): 478095883


   Code(s): J18.9 - PNEUMONIA, UNSPECIFIED ORGANISM   Status: Acute   Current 

Visit: Yes   








- Problem List Review


Problem List Initiated/Reviewed/Updated: Yes





- My Orders


Last 24 Hours: 


My Active Orders





09/20/21 15:02


Morphine   1 mg IVPUSH Q4H PRN 





09/21/21 05:11


CBC WITH AUTO DIFF [HEME] AM 


CMP [COMPREHENSIVE METABOLIC PN,CMP] [CHEM] AM 





09/22/21 05:11


CBC WITH AUTO DIFF [HEME] AM 


CMP [COMPREHENSIVE METABOLIC PN,CMP] [CHEM] AM 














- Plan


Plan:: 


1.  Community-acquired pneumonia


-Continue the patient on ceftriaxone 1 g IV every 24 hours, and continue 

azithromycin 500 mg IV every 24 hours


-Continue to monitor patient's white blood cell status through daily CBC until 

an appropriate level for discharge


-Continue the patient on DuoNeb therapy, every 4 hours





2.  Kidney disease


-The patient has an elevated BUN as well as creatinine level.  The patient is on

 fluids, 1000 mL at 125 mL/h we will continue to monitor renal function through 

daily CMP's





3.  Thoracic level back pain


-The patient has been given a prescription for IV morphine 1 g every 4 hours





<Artemio Garner - Last Filed: 09/21/21 15:19>





- Patient Data


Vitals - Most Recent: 


                                Last Vital Signs











Temp  36.4 C   09/21/21 12:03


 


Pulse  65   09/21/21 08:53


 


Resp  18   09/21/21 12:03


 


BP  147/77 H  09/21/21 12:03


 


Pulse Ox  95   09/21/21 12:03











I&O - Last 24 Hours: 


                                 Intake & Output











 09/21/21 09/21/21 09/21/21





 06:59 14:59 22:59


 


Intake Total 4473  


 


Output Total 2900 1400 


 


Balance 1573 -1400 











Lab Results Last 24 Hours: 


                         Laboratory Results - last 24 hr











  09/20/21 09/21/21 09/21/21 Range/Units





  17:26 05:47 05:47 


 


WBC   19.10 H   (4.0-11.0)  K/uL


 


RBC   3.65 L   (4.50-5.90)  M/uL


 


Hgb   11.2 L   (13.0-17.0)  g/dL


 


Hct   32.5 L   (38.0-50.0)  %


 


MCV   89.0   (80.0-98.0)  fL


 


MCH   30.7   (27.0-32.0)  pg


 


MCHC   34.5   (31.0-37.0)  g/dL


 


RDW Std Deviation   44.3   (28.0-62.0)  fl


 


RDW Coeff of Arpit   14   (11.0-15.0)  %


 


Plt Count   269   (150-400)  K/uL


 


MPV   9.80   (7.40-12.00)  fL


 


Neut % (Auto)   82.7 H   (48.0-80.0)  %


 


Lymph % (Auto)   9.2 L   (16.0-40.0)  %


 


Mono % (Auto)   7.1   (0.0-15.0)  %


 


Eos % (Auto)   0.8   (0.0-7.0)  %


 


Baso % (Auto)   0.2   (0.0-1.5)  %


 


Neut # (Auto)   15.8 H   (1.4-5.7)  K/uL


 


Lymph # (Auto)   1.8   (0.6-2.4)  K/uL


 


Mono # (Auto)   1.4 H   (0.0-0.8)  K/uL


 


Eos # (Auto)   0.2   (0.0-0.7)  K/uL


 


Baso # (Auto)   0.0   (0.0-0.1)  K/uL


 


Nucleated RBC %   0.0   /100WBC


 


Nucleated RBCs #   0   K/uL


 


Sodium    Cancelled  


 


Potassium    Cancelled  


 


Chloride    Cancelled  


 


Carbon Dioxide    Cancelled  


 


BUN    Cancelled  


 


Creatinine    Cancelled  


 


Est Cr Clr Drug Dosing    Cancelled  


 


Estimated GFR (MDRD)    Cancelled  


 


Glucose    Cancelled  


 


POC Glucose  174 H    (70-99)  mg/dL


 


Calcium    Cancelled  


 


Total Bilirubin    Cancelled  


 


AST    Cancelled  


 


ALT    Cancelled  


 


Alkaline Phosphatase    Cancelled  


 


Total Protein    Cancelled  


 


Albumin    Cancelled  


 


Globulin    Cancelled  


 


Albumin/Globulin Ratio    Cancelled  














  09/21/21 09/21/21 Range/Units





  06:29 11:36 


 


WBC    (4.0-11.0)  K/uL


 


RBC    (4.50-5.90)  M/uL


 


Hgb    (13.0-17.0)  g/dL


 


Hct    (38.0-50.0)  %


 


MCV    (80.0-98.0)  fL


 


MCH    (27.0-32.0)  pg


 


MCHC    (31.0-37.0)  g/dL


 


RDW Std Deviation    (28.0-62.0)  fl


 


RDW Coeff of Arpit    (11.0-15.0)  %


 


Plt Count    (150-400)  K/uL


 


MPV    (7.40-12.00)  fL


 


Neut % (Auto)    (48.0-80.0)  %


 


Lymph % (Auto)    (16.0-40.0)  %


 


Mono % (Auto)    (0.0-15.0)  %


 


Eos % (Auto)    (0.0-7.0)  %


 


Baso % (Auto)    (0.0-1.5)  %


 


Neut # (Auto)    (1.4-5.7)  K/uL


 


Lymph # (Auto)    (0.6-2.4)  K/uL


 


Mono # (Auto)    (0.0-0.8)  K/uL


 


Eos # (Auto)    (0.0-0.7)  K/uL


 


Baso # (Auto)    (0.0-0.1)  K/uL


 


Nucleated RBC %    /100WBC


 


Nucleated RBCs #    K/uL


 


Sodium    


 


Potassium    


 


Chloride    


 


Carbon Dioxide    


 


BUN    


 


Creatinine    


 


Est Cr Clr Drug Dosing    


 


Estimated GFR (MDRD)    


 


Glucose    


 


POC Glucose  147 H  190 H  (70-99)  mg/dL


 


Calcium    


 


Total Bilirubin    


 


AST    


 


ALT    


 


Alkaline Phosphatase    


 


Total Protein    


 


Albumin    


 


Globulin    


 


Albumin/Globulin Ratio    











Jostin Results Last 24 Hours: 


                                  Microbiology











 09/19/21 16:05 Aerobic Blood Culture - Preliminary





 Blood - Venous - Lab Draw    NO GROWTH AFTER 1 DAY





 Anaerobic Blood Culture - Preliminary





    NO GROWTH AFTER 1 DAY


 


 09/19/21 15:55 Aerobic Blood Culture - Preliminary





 Blood - Venous    NO GROWTH AFTER 1 DAY





 Anaerobic Blood Culture - Final











Med Orders - Current: 


                               Current Medications





Acetaminophen (Acetaminophen 325 Mg Tab)  325 mg PO Q6H PRN


   PRN Reason: Pain


   Last Admin: 09/19/21 17:30 Dose:  325 mg


   Documented by: 


Acidophilus/Pectin (Acidophilus With Citrus Pectin Tab)  1 tab PO DAILY [facility]


   Last Admin: 09/21/21 08:51 Dose:  1 tab


   Documented by: 


Atorvastatin Calcium (Atorvastatin 20 Mg Tab)  20 mg PO DAILY [facility]


   Last Admin: 09/21/21 08:52 Dose:  20 mg


   Documented by: 


Citalopram Hydrobromide (Citalopram 20 Mg Tab)  20 mg PO DAILY [facility]


   Last Admin: 09/21/21 08:54 Dose:  20 mg


   Documented by: 


Dextrose/Water (50% Dextrose In Water 50 Ml Syringe)  50 ml IVPUSH ASDIRECTED 

PRN


   PRN Reason: Hypoglycemia


Diltiazem HCl (Diltiazem 25 Mg/5 Ml Sdv)  20 mg IVPUSH Q4H PRN


   PRN Reason: Tachycardia


   Last Admin: 09/20/21 22:26 Dose:  20 mg


   Documented by: 


Glucagon (Glucagon,Human Recombinant 1 Mg Vial)  1 mg IM ASDIRECTED PRN


   PRN Reason: Hypoglycemia


Guaifenesin/Codeine Phosphate (Codeine/Guaifenesin  Mg/5 Ml Syrup 5 Ml 

Cup)  10 ml PO Q6H PRN


   PRN Reason: Cough


   Last Admin: 09/21/21 14:56 Dose:  10 ml


   Documented by: 


Heparin Sodium (Porcine) (Heparin Sodium 5,000 Units/Ml Vial)  5,000 units 

SUBCUT Q12H [facility]


   Last Admin: 09/21/21 08:56 Dose:  5,000 units


   Documented by: 


Sodium Chloride (Normal Saline)  1,000 mls @ 1,000 mls/hr IV ASDIRECTED [facility]


   Last Infusion: 09/19/21 14:40 Dose:  Infused


   Documented by: 


Ceftriaxone Sodium/Dextrose (Rocephin In Dextrose,Iso-Osm 1 Gm/50 Ml)  50 mls @ 

100 mls/hr IV Q24H [facility]


   Last Admin: 09/21/21 14:57 Dose:  100 mls/hr


   Documented by: 


Azithromycin 500 mg/ Sodium (Chloride)  250 mls @ 250 mls/hr IV Q24H [facility]


   Last Admin: 09/20/21 16:17 Dose:  250 mls/hr


   Documented by: 


Insulin Aspart (Insulin Aspart 100 Units/Ml 3 Ml Pen)  0 unit SUBCUT TIDAC [facility] [facility]; 

Protocol


   Last Admin: 09/21/21 11:56 Dose:  1 unit


   Documented by: 


Ipratropium Bromide (Ipratropium 0.02% 0.5 Mg/2.5 Ml Neb Soln)  0.5 mg NEB 

Q6HRRT PRN


   PRN Reason: wheezing/dyspnea


Melatonin (Melatonin 3 Mg Tab)  9 mg PO BEDTIME [facility]


   Last Admin: 09/20/21 20:50 Dose:  9 mg


   Documented by: 


Metoprolol Succinate (Metoprolol Succinate 50 Mg Tab.Er)  50 mg PO DAILY [facility]


   Last Admin: 09/21/21 08:53 Dose:  50 mg


   Documented by: 


Morphine Sulfate (Morphine 2 Mg/Ml Syringe)  1 mg IVPUSH Q4H PRN


   PRN Reason: Pain


   Last Admin: 09/21/21 13:17 Dose:  1 mg


   Documented by: 


Trazodone HCl (Trazodone 50 Mg Tab)  50 mg PO BEDTIME [facility]


   Last Admin: 09/20/21 20:50 Dose:  50 mg


   Documented by: 





Discontinued Medications





Albuterol/Ipratropium (Albuterol/Ipratropium 3.0-0.5 Mg/3 Ml Neb Soln)  3 ml NEB

 Q4HRRT [facility]


   Last Admin: 09/20/21 13:00 Dose:  3 ml


   Documented by: 


Azithromycin (Azithromycin 500 Mg Vial)  500 mg IV Q24H [facility]


Ceftriaxone Sodium (Ceftriaxone 1 Gm Vial)  1 gm IVPUSH ONETIME ONE


   Stop: 09/19/21 15:39


   Last Admin: 09/19/21 16:05 Dose:  1 gm


   Documented by: 


Azithromycin 500 mg/ Sodium (Chloride)  250 mls @ 250 mls/hr IV ONETIME [facility]


   Last Admin: 09/19/21 16:06 Dose:  250 mls/hr


   Documented by: 


Sterile Water (Sterile Water For Injection) Confirm Administered Dose 20 mls @ 

as directed .ROUTE .STK-MED ONE


   Stop: 09/19/21 15:44


   Last Admin: 09/19/21 15:56 Dose:  Not Given


   Documented by: 


Sodium Chloride (Normal Saline)  1,000 mls @ 999 mls/hr IV .Bolus ONE


   Stop: 09/19/21 17:23


   Last Admin: 09/19/21 17:07 Dose:  999 mls/hr


   Documented by: 


Ceftriaxone Sodium 1 gm/ (Sodium Chloride)  50 mls @ 100 mls/hr IV Q24H [facility]


Sodium Chloride (Normal Saline)  1,000 mls @ 125 mls/hr IV ASDIRECTED [facility]


   Last Admin: 09/19/21 17:09 Dose:  125 mls/hr


   Documented by: 


Sodium Chloride (Normal Saline)  500 mls @ 999 mls/hr IV BOLUS ONE


   Stop: 09/19/21 22:43


   Last Admin: 09/19/21 23:02 Dose:  999 mls/hr


   Documented by: 


Lactated Ringer's (Ringers, Lactated)  1,000 mls @ 125 mls/hr IV ASDIRECTED [facility]


   Last Admin: 09/21/21 07:35 Dose:  125 mls/hr


   Documented by: 


Metoprolol Tartrate (Metoprolol Tartrate 50 Mg Tab)  50 mg PO ONETIME ONE


   Stop: 09/20/21 23:11


   Last Admin: 09/20/21 23:36 Dose:  50 mg


   Documented by: 


Pneumococcal Polyvalent Vaccine (Pneumococcal 23-Valent Conjugate Vaccine 0.5 Ml

 Syringe)  25 mcg IM .ONCE ONE


   Stop: 09/20/21 09:01


Sterile Water (Water For Injection, Sterile 20 Ml Sdv)  20 ml INJECT ONETIME ONE


   Stop: 09/19/21 15:45


   Last Admin: 09/19/21 16:05 Dose:  20 ml


   Documented by: 











- Patient Data


Lab Results Last 24 hrs: 


                         Laboratory Results - last 24 hr











  09/20/21 09/21/21 09/21/21 Range/Units





  17:26 05:47 05:47 


 


WBC   19.10 H   (4.0-11.0)  K/uL


 


RBC   3.65 L   (4.50-5.90)  M/uL


 


Hgb   11.2 L   (13.0-17.0)  g/dL


 


Hct   32.5 L   (38.0-50.0)  %


 


MCV   89.0   (80.0-98.0)  fL


 


MCH   30.7   (27.0-32.0)  pg


 


MCHC   34.5   (31.0-37.0)  g/dL


 


RDW Std Deviation   44.3   (28.0-62.0)  fl


 


RDW Coeff of Arpit   14   (11.0-15.0)  %


 


Plt Count   269   (150-400)  K/uL


 


MPV   9.80   (7.40-12.00)  fL


 


Neut % (Auto)   82.7 H   (48.0-80.0)  %


 


Lymph % (Auto)   9.2 L   (16.0-40.0)  %


 


Mono % (Auto)   7.1   (0.0-15.0)  %


 


Eos % (Auto)   0.8   (0.0-7.0)  %


 


Baso % (Auto)   0.2   (0.0-1.5)  %


 


Neut # (Auto)   15.8 H   (1.4-5.7)  K/uL


 


Lymph # (Auto)   1.8   (0.6-2.4)  K/uL


 


Mono # (Auto)   1.4 H   (0.0-0.8)  K/uL


 


Eos # (Auto)   0.2   (0.0-0.7)  K/uL


 


Baso # (Auto)   0.0   (0.0-0.1)  K/uL


 


Nucleated RBC %   0.0   /100WBC


 


Nucleated RBCs #   0   K/uL


 


Sodium    Cancelled  


 


Potassium    Cancelled  


 


Chloride    Cancelled  


 


Carbon Dioxide    Cancelled  


 


BUN    Cancelled  


 


Creatinine    Cancelled  


 


Est Cr Clr Drug Dosing    Cancelled  


 


Estimated GFR (MDRD)    Cancelled  


 


Glucose    Cancelled  


 


POC Glucose  174 H    (70-99)  mg/dL


 


Calcium    Cancelled  


 


Total Bilirubin    Cancelled  


 


AST    Cancelled  


 


ALT    Cancelled  


 


Alkaline Phosphatase    Cancelled  


 


Total Protein    Cancelled  


 


Albumin    Cancelled  


 


Globulin    Cancelled  


 


Albumin/Globulin Ratio    Cancelled  














  09/21/21 09/21/21 Range/Units





  06:29 11:36 


 


WBC    (4.0-11.0)  K/uL


 


RBC    (4.50-5.90)  M/uL


 


Hgb    (13.0-17.0)  g/dL


 


Hct    (38.0-50.0)  %


 


MCV    (80.0-98.0)  fL


 


MCH    (27.0-32.0)  pg


 


MCHC    (31.0-37.0)  g/dL


 


RDW Std Deviation    (28.0-62.0)  fl


 


RDW Coeff of Arpit    (11.0-15.0)  %


 


Plt Count    (150-400)  K/uL


 


MPV    (7.40-12.00)  fL


 


Neut % (Auto)    (48.0-80.0)  %


 


Lymph % (Auto)    (16.0-40.0)  %


 


Mono % (Auto)    (0.0-15.0)  %


 


Eos % (Auto)    (0.0-7.0)  %


 


Baso % (Auto)    (0.0-1.5)  %


 


Neut # (Auto)    (1.4-5.7)  K/uL


 


Lymph # (Auto)    (0.6-2.4)  K/uL


 


Mono # (Auto)    (0.0-0.8)  K/uL


 


Eos # (Auto)    (0.0-0.7)  K/uL


 


Baso # (Auto)    (0.0-0.1)  K/uL


 


Nucleated RBC %    /100WBC


 


Nucleated RBCs #    K/uL


 


Sodium    


 


Potassium    


 


Chloride    


 


Carbon Dioxide    


 


BUN    


 


Creatinine    


 


Est Cr Clr Drug Dosing    


 


Estimated GFR (MDRD)    


 


Glucose    


 


POC Glucose  147 H  190 H  (70-99)  mg/dL


 


Calcium    


 


Total Bilirubin    


 


AST    


 


ALT    


 


Alkaline Phosphatase    


 


Total Protein    


 


Albumin    


 


Globulin    


 


Albumin/Globulin Ratio    











Result Diagrams: 


                                 09/21/21 05:47





                                 09/20/21 02:25


[facility] Results Last 24 hrs: 


                                  Microbiology











 09/19/21 16:05 Aerobic Blood Culture - Preliminary





 Blood - Venous - Lab Draw    NO GROWTH AFTER 1 DAY





 Anaerobic Blood Culture - Preliminary





    NO GROWTH AFTER 1 DAY


 


 09/19/21 15:55 Aerobic Blood Culture - Preliminary





 Blood - Venous    NO GROWTH AFTER 1 DAY





 Anaerobic Blood Culture - Final














Sepsis Event Note





- Focused Exam


Vital Signs: 


                                   Vital Signs











  Temp Pulse Pulse Resp BP BP Pulse Ox


 


 09/21/21 12:03  36.4 C    18   147/77 H  95


 


 09/21/21 08:53   65    137/82  


 


 09/21/21 07:41  36.1 C   65  16   137/82  97


 


 09/21/21 05:55  37.2 C   60  18   142/80 H  96














- My Orders


Last 24 Hours: 


My Active Orders





09/20/21 21:42


Diltiazem   20 mg IVPUSH Q4H PRN 





09/20/21 21:44


Codeine/guaiFENesin [Robitussin AC]   10 ml PO Q6H PRN 














- Plan


Plan:: 





I have seen and evaluated the patient and agree with the residents note unless 

specified in my note

## 2021-09-21 LAB
BUN SERPL-MCNC: 23 MG/DL (ref 7–18)
CHLORIDE SERPL-SCNC: 103 MMOL/L (ref 98–107)
CO2 SERPL-SCNC: 22 MMOL/L (ref 21–32)
GLUCOSE SERPL-MCNC: 145 MG/DL (ref 74–106)
POTASSIUM SERPL-SCNC: 4.4 MMOL/L (ref 3.5–5.1)
SODIUM SERPL-SCNC: 137 MMOL/L (ref 136–148)

## 2021-09-21 RX ADMIN — DEXTROSE SCH UNITS: 10 SOLUTION INTRAVENOUS at 08:56

## 2021-09-21 RX ADMIN — Medication SCH TAB: at 08:51

## 2021-09-21 RX ADMIN — GUAIFENESIN AND CODEINE PHOSPHATE PRN ML: 100; 10 SOLUTION ORAL at 07:34

## 2021-09-21 RX ADMIN — SODIUM CHLORIDE PRN MG: 9 INJECTION, SOLUTION INTRAVENOUS at 20:32

## 2021-09-21 RX ADMIN — GUAIFENESIN AND CODEINE PHOSPHATE PRN ML: 100; 10 SOLUTION ORAL at 14:56

## 2021-09-21 RX ADMIN — DEXTROSE SCH: 10 SOLUTION INTRAVENOUS at 21:39

## 2021-09-21 NOTE — PCM.PN
<Devon Good - Last Filed: 09/21/21 13:32>





- General Info


Date of Service: 09/21/21


Subjective Update: 


The patient is a 66-year-old  male on day 3 of admission with a 

significant past medical history of hypertension, diabetes mellitus, and right 

nephrectomy who was admitted for community-acquired pneumonia and kidney 

disease.  The patient also is complaining of thoracic back pain.  While in 

hospital the patient has had episodes of atrial fibrillation that with treatment

with diltiazem and metoprolol have converted to sinus rhythm.  Upon interview he

says that he has less cough than previous and that he is less short of breath.  

He has no other complaints at this time.








- Review of Systems


General: Denies: Fever, Weakness, Fatigue, Chills


HEENT: Denies: Headaches, Sore Throat


Pulmonary: Reports: Shortness of Breath, Cough.  Denies: Sputum, Hemoptysis


Cardiovascular: Denies: Chest Pain, Palpitations, Dyspnea on Exertion


Gastrointestinal: Denies: Abdominal Pain, Difficulty Swallowing


Musculoskeletal: Reports: Back Pain





- Patient Data


Vitals - Most Recent: 


                                Last Vital Signs











Temp  97.6 F   09/21/21 12:03


 


Pulse  65   09/21/21 08:53


 


Resp  16   09/21/21 07:41


 


BP  147/77 H  09/21/21 12:03


 


Pulse Ox  95   09/21/21 12:03











Weight - Most Recent: 104.236 kg


I&O - Last 24 Hours: 


                                 Intake & Output











 09/20/21 09/21/21 09/21/21





 22:59 06:59 14:59


 


Intake Total 1500 4473 


 


Output Total 1400 2900 


 


Balance 100 1573 











Lab Results Last 24 Hours: 


                         Laboratory Results - last 24 hr











  09/20/21 09/21/21 09/21/21 Range/Units





  17:26 05:47 05:47 


 


WBC   19.10 H   (4.0-11.0)  K/uL


 


RBC   3.65 L   (4.50-5.90)  M/uL


 


Hgb   11.2 L   (13.0-17.0)  g/dL


 


Hct   32.5 L   (38.0-50.0)  %


 


MCV   89.0   (80.0-98.0)  fL


 


MCH   30.7   (27.0-32.0)  pg


 


MCHC   34.5   (31.0-37.0)  g/dL


 


RDW Std Deviation   44.3   (28.0-62.0)  fl


 


RDW Coeff of Arpit   14   (11.0-15.0)  %


 


Plt Count   269   (150-400)  K/uL


 


MPV   9.80   (7.40-12.00)  fL


 


Neut % (Auto)   82.7 H   (48.0-80.0)  %


 


Lymph % (Auto)   9.2 L   (16.0-40.0)  %


 


Mono % (Auto)   7.1   (0.0-15.0)  %


 


Eos % (Auto)   0.8   (0.0-7.0)  %


 


Baso % (Auto)   0.2   (0.0-1.5)  %


 


Neut # (Auto)   15.8 H   (1.4-5.7)  K/uL


 


Lymph # (Auto)   1.8   (0.6-2.4)  K/uL


 


Mono # (Auto)   1.4 H   (0.0-0.8)  K/uL


 


Eos # (Auto)   0.2   (0.0-0.7)  K/uL


 


Baso # (Auto)   0.0   (0.0-0.1)  K/uL


 


Nucleated RBC %   0.0   /100WBC


 


Nucleated RBCs #   0   K/uL


 


Sodium    137  (136-148)  mmol/L


 


Potassium    4.4  (3.5-5.1)  mmol/L


 


Chloride    103  ()  mmol/L


 


Carbon Dioxide    22.0  (21.0-32.0)  mmol/L


 


BUN    23 H  (7.0-18.0)  mg/dL


 


Creatinine    2.0 H  (0.8-1.3)  mg/dL


 


Est Cr Clr Drug Dosing    38.02  mL/min


 


Estimated GFR (MDRD)    33.7  ml/min


 


Glucose    145 H  ()  mg/dL


 


POC Glucose  174 H    (70-99)  mg/dL


 


Calcium    9.1  (8.5-10.1)  mg/dL


 


Total Bilirubin    0.6  (0.2-1.0)  mg/dL


 


AST    17  (15-37)  IU/L


 


ALT    20  (14-63)  IU/L


 


Alkaline Phosphatase    60  ()  U/L


 


Total Protein    6.9  (6.4-8.2)  g/dL


 


Albumin    2.9 L  (3.4-5.0)  g/dL


 


Globulin    4.0  (2.6-4.0)  g/dL


 


Albumin/Globulin Ratio    0.7 L  (0.9-1.6)  














  09/21/21 09/21/21 Range/Units





  06:29 11:36 


 


WBC    (4.0-11.0)  K/uL


 


RBC    (4.50-5.90)  M/uL


 


Hgb    (13.0-17.0)  g/dL


 


Hct    (38.0-50.0)  %


 


MCV    (80.0-98.0)  fL


 


MCH    (27.0-32.0)  pg


 


MCHC    (31.0-37.0)  g/dL


 


RDW Std Deviation    (28.0-62.0)  fl


 


RDW Coeff of Arpit    (11.0-15.0)  %


 


Plt Count    (150-400)  K/uL


 


MPV    (7.40-12.00)  fL


 


Neut % (Auto)    (48.0-80.0)  %


 


Lymph % (Auto)    (16.0-40.0)  %


 


Mono % (Auto)    (0.0-15.0)  %


 


Eos % (Auto)    (0.0-7.0)  %


 


Baso % (Auto)    (0.0-1.5)  %


 


Neut # (Auto)    (1.4-5.7)  K/uL


 


Lymph # (Auto)    (0.6-2.4)  K/uL


 


Mono # (Auto)    (0.0-0.8)  K/uL


 


Eos # (Auto)    (0.0-0.7)  K/uL


 


Baso # (Auto)    (0.0-0.1)  K/uL


 


Nucleated RBC %    /100WBC


 


Nucleated RBCs #    K/uL


 


Sodium    (136-148)  mmol/L


 


Potassium    (3.5-5.1)  mmol/L


 


Chloride    ()  mmol/L


 


Carbon Dioxide    (21.0-32.0)  mmol/L


 


BUN    (7.0-18.0)  mg/dL


 


Creatinine    (0.8-1.3)  mg/dL


 


Est Cr Clr Drug Dosing    mL/min


 


Estimated GFR (MDRD)    ml/min


 


Glucose    ()  mg/dL


 


POC Glucose  147 H  190 H  (70-99)  mg/dL


 


Calcium    (8.5-10.1)  mg/dL


 


Total Bilirubin    (0.2-1.0)  mg/dL


 


AST    (15-37)  IU/L


 


ALT    (14-63)  IU/L


 


Alkaline Phosphatase    ()  U/L


 


Total Protein    (6.4-8.2)  g/dL


 


Albumin    (3.4-5.0)  g/dL


 


Globulin    (2.6-4.0)  g/dL


 


Albumin/Globulin Ratio    (0.9-1.6)  











Jostin Results Last 24 Hours: 


                                  Microbiology











 09/19/21 16:05 Aerobic Blood Culture - Preliminary





 Blood - Venous - Lab Draw    NO GROWTH AFTER 1 DAY





 Anaerobic Blood Culture - Preliminary





    NO GROWTH AFTER 1 DAY


 


 09/19/21 15:55 Aerobic Blood Culture - Preliminary





 Blood - Venous    NO GROWTH AFTER 1 DAY





 Anaerobic Blood Culture - Final











Med Orders - Current: 


                               Current Medications





Acetaminophen (Acetaminophen 325 Mg Tab)  325 mg PO Q6H PRN


   PRN Reason: Pain


   Last Admin: 09/19/21 17:30 Dose:  325 mg


   Documented by: 


Acidophilus/Pectin (Acidophilus With Citrus Pectin Tab)  1 tab PO DAILY [facility]


   Last Admin: 09/21/21 08:51 Dose:  1 tab


   Documented by: 


Atorvastatin Calcium (Atorvastatin 20 Mg Tab)  20 mg PO DAILY [facility]


   Last Admin: 09/21/21 08:52 Dose:  20 mg


   Documented by: 


Citalopram Hydrobromide (Citalopram 20 Mg Tab)  20 mg PO DAILY [facility]


   Last Admin: 09/21/21 08:54 Dose:  20 mg


   Documented by: 


Dextrose/Water (50% Dextrose In Water 50 Ml Syringe)  50 ml IVPUSH ASDIRECTED 

PRN


   PRN Reason: Hypoglycemia


Diltiazem HCl (Diltiazem 25 Mg/5 Ml Sdv)  20 mg IVPUSH Q4H PRN


   PRN Reason: Tachycardia


   Last Admin: 09/20/21 22:26 Dose:  20 mg


   Documented by: 


Glucagon (Glucagon,Human Recombinant 1 Mg Vial)  1 mg IM ASDIRECTED PRN


   PRN Reason: Hypoglycemia


Guaifenesin/Codeine Phosphate (Codeine/Guaifenesin  Mg/5 Ml Syrup 5 Ml 

Cup)  10 ml PO Q6H PRN


   PRN Reason: Cough


   Last Admin: 09/21/21 07:34 Dose:  10 ml


   Documented by: 


Heparin Sodium (Porcine) (Heparin Sodium 5,000 Units/Ml Vial)  5,000 units 

SUBCUT Q12H [facility]


   Last Admin: 09/21/21 08:56 Dose:  5,000 units


   Documented by: 


Sodium Chloride (Normal Saline)  1,000 mls @ 1,000 mls/hr IV ASDIRECTED [facility]


   Last Infusion: 09/19/21 14:40 Dose:  Infused


   Documented by: 


Ceftriaxone Sodium/Dextrose (Rocephin In Dextrose,Iso-Osm 1 Gm/50 Ml)  50 mls @ 

100 mls/hr IV Q24H [facility]


   Last Admin: 09/20/21 15:24 Dose:  100 mls/hr


   Documented by: 


Azithromycin 500 mg/ Sodium (Chloride)  250 mls @ 250 mls/hr IV Q24H [facility]


   Last Admin: 09/20/21 16:17 Dose:  250 mls/hr


   Documented by: 


Insulin Aspart (Insulin Aspart 100 Units/Ml 3 Ml Pen)  0 unit SUBCUT TIDAC [facility]; 

Protocol


   Last Admin: 09/21/21 11:56 Dose:  1 unit


   Documented by: 


Ipratropium Bromide (Ipratropium 0.02% 0.5 Mg/2.5 Ml Neb Soln)  0.5 mg NEB 

Q6HRRT PRN


   PRN Reason: wheezing/dyspnea


Melatonin (Melatonin 3 Mg Tab)  9 mg PO BEDTIME [facility]


   Last Admin: 09/20/21 20:50 Dose:  9 mg


   Documented by: 


Metoprolol Succinate (Metoprolol Succinate 50 Mg Tab.Er)  50 mg PO DAILY [facility]


   Last Admin: 09/21/21 08:53 Dose:  50 mg


   Documented by: 


Morphine Sulfate (Morphine 2 Mg/Ml Syringe)  1 mg IVPUSH Q4H PRN


   PRN Reason: Pain


   Last Admin: 09/21/21 13:17 Dose:  1 mg


   Documented by: 


Trazodone HCl (Trazodone 50 Mg Tab)  50 mg PO BEDTIME [facility]


   Last Admin: 09/20/21 20:50 Dose:  50 mg


   Documented by: 





Discontinued Medications





Albuterol/Ipratropium (Albuterol/Ipratropium 3.0-0.5 Mg/3 Ml Neb Soln)  3 ml NEB

Q4HRRT [facility]


   Last Admin: 09/20/21 13:00 Dose:  3 ml


   Documented by: 


Azithromycin (Azithromycin 500 Mg Vial)  500 mg IV Q24H [facility]


Ceftriaxone Sodium (Ceftriaxone 1 Gm Vial)  1 gm IVPUSH ONETIME ONE


   Stop: 09/19/21 15:39


   Last Admin: 09/19/21 16:05 Dose:  1 gm


   Documented by: 


Azithromycin 500 mg/ Sodium (Chloride)  250 mls @ 250 mls/hr IV ONETIME [facility]


   Last Admin: 09/19/21 16:06 Dose:  250 mls/hr


   Documented by: 


Sterile Water (Sterile Water For Injection) Confirm Administered Dose 20 mls @ 

as directed .ROUTE .STK-MED ONE


   Stop: 09/19/21 15:44


   Last Admin: 09/19/21 15:56 Dose:  Not Given


   Documented by: 


Sodium Chloride (Normal Saline)  1,000 mls @ 999 mls/hr IV .Bolus ONE


   Stop: 09/19/21 17:23


   Last Admin: 09/19/21 17:07 Dose:  999 mls/hr


   Documented by: 


Ceftriaxone Sodium 1 gm/ (Sodium Chloride)  50 mls @ 100 mls/hr IV Q24H KYLE


Sodium Chloride (Normal Saline)  1,000 mls @ 125 mls/hr IV ASDIRECTED [facility]


   Last Admin: 09/19/21 17:09 Dose:  125 mls/hr


   Documented by: 


Sodium Chloride (Normal Saline)  500 mls @ 999 mls/hr IV BOLUS ONE


   Stop: 09/19/21 22:43


   Last Admin: 09/19/21 23:02 Dose:  999 mls/hr


   Documented by: 


Lactated Ringer's (Ringers, Lactated)  1,000 mls @ 125 mls/hr IV ASDIRECTED [facility]


   Last Admin: 09/21/21 07:35 Dose:  125 mls/hr


   Documented by: 


Metoprolol Tartrate (Metoprolol Tartrate 50 Mg Tab)  50 mg PO ONETIME ONE


   Stop: 09/20/21 23:11


   Last Admin: 09/20/21 23:36 Dose:  50 mg


   Documented by: 


Pneumococcal Polyvalent Vaccine (Pneumococcal 23-Valent Conjugate Vaccine 0.5 Ml

Syringe)  25 mcg IM .ONCE ONE


   Stop: 09/20/21 09:01


Sterile Water (Water For Injection, Sterile 20 Ml Sdv)  20 ml INJECT ONETIME ONE


   Stop: 09/19/21 15:45


   Last Admin: 09/19/21 16:05 Dose:  20 ml


   Documented by: 











- Exam


General: Alert, Oriented, Cooperative


Neck: No: Lymphadenopathy


Lungs: Wheezing


Cardiovascular: Regular Rate, Regular Rhythm, No Murmurs


GI/Abdominal Exam: Normal Bowel Sounds, Soft, Non-Tender


Back Exam: Other (Pain on palpation, midline, T7 area)





- Patient Data


Lab Results Last 24 hrs: 


                         Laboratory Results - last 24 hr











  09/20/21 09/21/21 09/21/21 Range/Units





  17:26 05:47 05:47 


 


WBC   19.10 H   (4.0-11.0)  K/uL


 


RBC   3.65 L   (4.50-5.90)  M/uL


 


Hgb   11.2 L   (13.0-17.0)  g/dL


 


Hct   32.5 L   (38.0-50.0)  %


 


MCV   89.0   (80.0-98.0)  fL


 


MCH   30.7   (27.0-32.0)  pg


 


MCHC   34.5   (31.0-37.0)  g/dL


 


RDW Std Deviation   44.3   (28.0-62.0)  fl


 


RDW Coeff of Arpit   14   (11.0-15.0)  %


 


Plt Count   269   (150-400)  K/uL


 


MPV   9.80   (7.40-12.00)  fL


 


Neut % (Auto)   82.7 H   (48.0-80.0)  %


 


Lymph % (Auto)   9.2 L   (16.0-40.0)  %


 


Mono % (Auto)   7.1   (0.0-15.0)  %


 


Eos % (Auto)   0.8   (0.0-7.0)  %


 


Baso % (Auto)   0.2   (0.0-1.5)  %


 


Neut # (Auto)   15.8 H   (1.4-5.7)  K/uL


 


Lymph # (Auto)   1.8   (0.6-2.4)  K/uL


 


Mono # (Auto)   1.4 H   (0.0-0.8)  K/uL


 


Eos # (Auto)   0.2   (0.0-0.7)  K/uL


 


Baso # (Auto)   0.0   (0.0-0.1)  K/uL


 


Nucleated RBC %   0.0   /100WBC


 


Nucleated RBCs #   0   K/uL


 


Sodium    137  (136-148)  mmol/L


 


Potassium    4.4  (3.5-5.1)  mmol/L


 


Chloride    103  ()  mmol/L


 


Carbon Dioxide    22.0  (21.0-32.0)  mmol/L


 


BUN    23 H  (7.0-18.0)  mg/dL


 


Creatinine    2.0 H  (0.8-1.3)  mg/dL


 


Est Cr Clr Drug Dosing    38.02  mL/min


 


Estimated GFR (MDRD)    33.7  ml/min


 


Glucose    145 H  ()  mg/dL


 


POC Glucose  174 H    (70-99)  mg/dL


 


Calcium    9.1  (8.5-10.1)  mg/dL


 


Total Bilirubin    0.6  (0.2-1.0)  mg/dL


 


AST    17  (15-37)  IU/L


 


ALT    20  (14-63)  IU/L


 


Alkaline Phosphatase    60  ()  U/L


 


Total Protein    6.9  (6.4-8.2)  g/dL


 


Albumin    2.9 L  (3.4-5.0)  g/dL


 


Globulin    4.0  (2.6-4.0)  g/dL


 


Albumin/Globulin Ratio    0.7 L  (0.9-1.6)  














  09/21/21 09/21/21 Range/Units





  06:29 11:36 


 


WBC    (4.0-11.0)  K/uL


 


RBC    (4.50-5.90)  M/uL


 


Hgb    (13.0-17.0)  g/dL


 


Hct    (38.0-50.0)  %


 


MCV    (80.0-98.0)  fL


 


MCH    (27.0-32.0)  pg


 


MCHC    (31.0-37.0)  g/dL


 


RDW Std Deviation    (28.0-62.0)  fl


 


RDW Coeff of Arpit    (11.0-15.0)  %


 


Plt Count    (150-400)  K/uL


 


MPV    (7.40-12.00)  fL


 


Neut % (Auto)    (48.0-80.0)  %


 


Lymph % (Auto)    (16.0-40.0)  %


 


Mono % (Auto)    (0.0-15.0)  %


 


Eos % (Auto)    (0.0-7.0)  %


 


Baso % (Auto)    (0.0-1.5)  %


 


Neut # (Auto)    (1.4-5.7)  K/uL


 


Lymph # (Auto)    (0.6-2.4)  K/uL


 


Mono # (Auto)    (0.0-0.8)  K/uL


 


Eos # (Auto)    (0.0-0.7)  K/uL


 


Baso # (Auto)    (0.0-0.1)  K/uL


 


Nucleated RBC %    /100WBC


 


Nucleated RBCs #    K/uL


 


Sodium    (136-148)  mmol/L


 


Potassium    (3.5-5.1)  mmol/L


 


Chloride    ()  mmol/L


 


Carbon Dioxide    (21.0-32.0)  mmol/L


 


BUN    (7.0-18.0)  mg/dL


 


Creatinine    (0.8-1.3)  mg/dL


 


Est Cr Clr Drug Dosing    mL/min


 


Estimated GFR (MDRD)    ml/min


 


Glucose    ()  mg/dL


 


POC Glucose  147 H  190 H  (70-99)  mg/dL


 


Calcium    (8.5-10.1)  mg/dL


 


Total Bilirubin    (0.2-1.0)  mg/dL


 


AST    (15-37)  IU/L


 


ALT    (14-63)  IU/L


 


Alkaline Phosphatase    ()  U/L


 


Total Protein    (6.4-8.2)  g/dL


 


Albumin    (3.4-5.0)  g/dL


 


Globulin    (2.6-4.0)  g/dL


 


Albumin/Globulin Ratio    (0.9-1.6)  











Result Diagrams: 


                                 09/21/21 05:47





                                 09/21/21 05:47


Jostin Results Last 24 hrs: 


                                  Microbiology











 09/19/21 16:05 Aerobic Blood Culture - Preliminary





 Blood - Venous - Lab Draw    NO GROWTH AFTER 1 DAY





 Anaerobic Blood Culture - Preliminary





    NO GROWTH AFTER 1 DAY


 


 09/19/21 15:55 Aerobic Blood Culture - Preliminary





 Blood - Venous    NO GROWTH AFTER 1 DAY





 Anaerobic Blood Culture - Final














Sepsis Event Note





- Evaluation


Sepsis Screening Result: No Definite Risk





- Focused Exam


Vital Signs: 


                                   Vital Signs











  Temp Pulse Pulse Resp BP BP Pulse Ox


 


 09/21/21 12:03  97.6 F      147/77 H  95


 


 09/21/21 08:53   65    137/82  


 


 09/21/21 07:41  97 F   65  16   137/82  97


 


 09/21/21 05:55  98.9 F   60  18   142/80 H  96














- Problem List & Annotations


(1) Back pain of thoracolumbar region


SNOMED Code(s): 451260316


   Code(s): M54.5 - LOW BACK PAIN; M54.6 - PAIN IN THORACIC SPINE   Status: 

Acute   Current Visit: Yes   





(2) Kidney disease


SNOMED Code(s): 89157470


   Code(s): N28.9 - DISORDER OF KIDNEY AND URETER, UNSPECIFIED   Status: Acute  

Current Visit: Yes   





(3) Pneumonia


SNOMED Code(s): 415288864


   Code(s): J18.9 - PNEUMONIA, UNSPECIFIED ORGANISM   Status: Acute   Current 

Visit: Yes   





(4) Paroxysmal A-fib


SNOMED Code(s): 836628461


   Code(s): I48.0 - PAROXYSMAL ATRIAL FIBRILLATION   Status: Acute   Current 

Visit: Yes   





- Problem List Review


Problem List Initiated/Reviewed/Updated: Yes





- My Orders


Last 24 Hours: 


My Active Orders





09/20/21 15:02


Morphine   1 mg IVPUSH Q4H PRN 





09/22/21 05:11


CBC WITH AUTO DIFF [HEME] AM 


CMP [COMPREHENSIVE METABOLIC PN,CMP] [CHEM] AM 














- Plan


Plan:: 


1.  Community-acquired pneumonia


-Continue the patient on ceftriaxone 1 g IV every 24 hours, and continue 

azithromycin 500 mg IV every 24 hours


-Continue to monitor patient's white blood cell status through daily CBC 


-Continue the patient on DuoNeb therapy, every 4 hours





2.  Kidney disease


-The patient has an elevated BUN and creatinine level which has decreased from 

previous laboratory exams.  He is doing better but we must continue to monitor 

these levels.  The patient is on fluids, 1000 mL at 125 mL/h we will continue to

 monitor renal function through daily CMP's





3.  Thoracic level back pain


-The patient is on IV morphine 1 mg every 4 hours





4.  Paroxysmal atrial fibrillation


-The patient had an episode of atrial fibrillation last night and was given 

diltiazem 20 mg twice daily as well as 50 mg of metoprolol, he is currently on 

telemetry, and is back in sinus rhythm.  We will continue to monitor his heart's

 electrical status.





<Artemio Garner - Last Filed: 09/21/21 15:15>





- Patient Data


Vitals - Most Recent: 


                                Last Vital Signs











Temp  36.4 C   09/21/21 12:03


 


Pulse  65   09/21/21 08:53


 


Resp  18   09/21/21 12:03


 


BP  147/77 H  09/21/21 12:03


 


Pulse Ox  95   09/21/21 12:03











I&O - Last 24 Hours: 


                                 Intake & Output











 09/21/21 09/21/21 09/21/21





 06:59 14:59 22:59


 


Intake Total 4473  


 


Output Total 2900 1400 


 


Balance 1573 -1400 











Lab Results Last 24 Hours: 


                         Laboratory Results - last 24 hr











  09/20/21 09/21/21 09/21/21 Range/Units





  17:26 05:47 05:47 


 


WBC   19.10 H   (4.0-11.0)  K/uL


 


RBC   3.65 L   (4.50-5.90)  M/uL


 


Hgb   11.2 L   (13.0-17.0)  g/dL


 


Hct   32.5 L   (38.0-50.0)  %


 


MCV   89.0   (80.0-98.0)  fL


 


MCH   30.7   (27.0-32.0)  pg


 


MCHC   34.5   (31.0-37.0)  g/dL


 


RDW Std Deviation   44.3   (28.0-62.0)  fl


 


RDW Coeff of Arpit   14   (11.0-15.0)  %


 


Plt Count   269   (150-400)  K/uL


 


MPV   9.80   (7.40-12.00)  fL


 


Neut % (Auto)   82.7 H   (48.0-80.0)  %


 


Lymph % (Auto)   9.2 L   (16.0-40.0)  %


 


Mono % (Auto)   7.1   (0.0-15.0)  %


 


Eos % (Auto)   0.8   (0.0-7.0)  %


 


Baso % (Auto)   0.2   (0.0-1.5)  %


 


Neut # (Auto)   15.8 H   (1.4-5.7)  K/uL


 


Lymph # (Auto)   1.8   (0.6-2.4)  K/uL


 


Mono # (Auto)   1.4 H   (0.0-0.8)  K/uL


 


Eos # (Auto)   0.2   (0.0-0.7)  K/uL


 


Baso # (Auto)   0.0   (0.0-0.1)  K/uL


 


Nucleated RBC %   0.0   /100WBC


 


Nucleated RBCs #   0   K/uL


 


Sodium    Cancelled  


 


Potassium    Cancelled  


 


Chloride    Cancelled  


 


Carbon Dioxide    Cancelled  


 


BUN    Cancelled  


 


Creatinine    Cancelled  


 


Est Cr Clr Drug Dosing    Cancelled  


 


Estimated GFR (MDRD)    Cancelled  


 


Glucose    Cancelled  


 


POC Glucose  174 H    (70-99)  mg/dL


 


Calcium    Cancelled  


 


Total Bilirubin    Cancelled  


 


AST    Cancelled  


 


ALT    Cancelled  


 


Alkaline Phosphatase    Cancelled  


 


Total Protein    Cancelled  


 


Albumin    Cancelled  


 


Globulin    Cancelled  


 


Albumin/Globulin Ratio    Cancelled  














  09/21/21 09/21/21 Range/Units





  06:29 11:36 


 


WBC    (4.0-11.0)  K/uL


 


RBC    (4.50-5.90)  M/uL


 


Hgb    (13.0-17.0)  g/dL


 


Hct    (38.0-50.0)  %


 


MCV    (80.0-98.0)  fL


 


MCH    (27.0-32.0)  pg


 


MCHC    (31.0-37.0)  g/dL


 


RDW Std Deviation    (28.0-62.0)  fl


 


RDW Coeff of Arpit    (11.0-15.0)  %


 


Plt Count    (150-400)  K/uL


 


MPV    (7.40-12.00)  fL


 


Neut % (Auto)    (48.0-80.0)  %


 


Lymph % (Auto)    (16.0-40.0)  %


 


Mono % (Auto)    (0.0-15.0)  %


 


Eos % (Auto)    (0.0-7.0)  %


 


Baso % (Auto)    (0.0-1.5)  %


 


Neut # (Auto)    (1.4-5.7)  K/uL


 


Lymph # (Auto)    (0.6-2.4)  K/uL


 


Mono # (Auto)    (0.0-0.8)  K/uL


 


Eos # (Auto)    (0.0-0.7)  K/uL


 


Baso # (Auto)    (0.0-0.1)  K/uL


 


Nucleated RBC %    /100WBC


 


Nucleated RBCs #    K/uL


 


Sodium    


 


Potassium    


 


Chloride    


 


Carbon Dioxide    


 


BUN    


 


Creatinine    


 


Est Cr Clr Drug Dosing    


 


Estimated GFR (MDRD)    


 


Glucose    


 


POC Glucose  147 H  190 H  (70-99)  mg/dL


 


Calcium    


 


Total Bilirubin    


 


AST    


 


ALT    


 


Alkaline Phosphatase    


 


Total Protein    


 


Albumin    


 


Globulin    


 


Albumin/Globulin Ratio    











[facility]ost[facility] Results Last 24 Hours: 


                                  Microbiology











 09/19/21 16:05 Aerobic Blood Culture - Preliminary





 Blood - Venous - Lab Draw    NO GROWTH AFTER 1 DAY





 Anaerobic Blood Culture - Preliminary





    NO GROWTH AFTER 1 DAY


 


 09/19/21 15:55 Aerobic Blood Culture - Preliminary





 Blood - Venous    NO GROWTH AFTER 1 DAY





 Anaerobic Blood Culture - Final











Med Orders - Current: 


                               Current Medications





Acetaminophen (Acetaminophen 325 Mg Tab)  325 mg PO Q6H PRN


   PRN Reason: Pain


   Last Admin: 09/19/21 17:30 Dose:  325 mg


   Documented by: 


Acidophilus/Pectin (Acidophilus With Citrus Pectin Tab)  1 tab PO DAILY [facility]


   Last Admin: 09/21/21 08:51 Dose:  1 tab


   Documented by: 


Atorvastatin Calcium (Atorvastatin 20 Mg Tab)  20 mg PO DAILY [facility]


   Last Admin: 09/21/21 08:52 Dose:  20 mg


   Documented by: 


Citalopram Hydrobromide (Citalopram 20 Mg Tab)  20 mg PO DAILY [facility]


   Last Admin: 09/21/21 08:54 Dose:  20 mg


   Documented by: 


Dextrose/Water (50% Dextrose In Water 50 Ml Syringe)  50 ml IVPUSH ASDIRECTED 

PRN


   PRN Reason: Hypoglycemia


Diltiazem HCl (Diltiazem 25 Mg/5 Ml Sdv)  20 mg IVPUSH Q4H PRN


   PRN Reason: Tachycardia


   Last Admin: 09/20/21 22:26 Dose:  20 mg


   Documented by: 


Glucagon (Glucagon,Human Recombinant 1 Mg Vial)  1 mg IM ASDIRECTED PRN


   PRN Reason: Hypoglycemia


Guaifenesin/Codeine Phosphate (Codeine/Guaifenesin  Mg/5 Ml Syrup 5 Ml 

Cup)  10 ml PO Q6H PRN


   PRN Reason: Cough


   Last Admin: 09/21/21 14:56 Dose:  10 ml


   Documented by: 


Heparin Sodium (Porcine) (Heparin Sodium 5,000 Units/Ml Vial)  5,000 units 

SUBCUT Q12H [facility]


   Last Admin: 09/21/21 08:56 Dose:  5,000 units


   Documented by: 


Sodium Chloride (Normal Saline)  1,000 mls @ 1,000 mls/hr IV ASDIRECTED [facility]


   Last Infusion: 09/19/21 14:40 Dose:  Infused


   Documented by: 


Ceftriaxone Sodium/Dextrose (Rocephin In Dextrose,Iso-Osm 1 Gm/50 Ml)  50 mls @ 

100 mls/hr IV Q24H [facility]


   Last Admin: 09/21/21 14:57 Dose:  100 mls/hr


   Documented by: 


Azithromycin 500 mg/ Sodium (Chloride)  250 mls @ 250 mls/hr IV Q24H [facility]


   Last Admin: 09/20/21 16:17 Dose:  250 mls/hr


   Documented by: 


Insulin Aspart (Insulin Aspart 100 Units/Ml 3 Ml Pen)  0 unit SUBCUT TIDAC [facility]; 

Protocol


   Last Admin: 09/21/21 11:56 Dose:  1 unit


   Documented by: 


Ipratropium Bromide (Ipratropium 0.02% 0.5 Mg/2.5 Ml Neb Soln)  0.5 mg NEB 

Q6HRRT PRN


   PRN Reason: wheezing/dyspnea


Melatonin (Melatonin 3 Mg Tab)  9 mg PO BEDTIME [facility]


   Last Admin: 09/20/21 20:50 Dose:  9 mg


   Documented by: 


Metoprolol Succinate (Metoprolol Succinate 50 Mg Tab.Er)  50 mg PO DAILY [facility]


   Last Admin: 09/21/21 08:53 Dose:  50 mg


   Documented by: 


Morphine Sulfate (Morphine 2 Mg/Ml Syringe)  1 mg IVPUSH Q4H PRN


   PRN Reason: Pain


   Last Admin: 09/21/21 13:17 Dose:  1 mg


   Documented by: 


Trazodone HCl (Trazodone 50 Mg Tab)  50 mg PO BEDTIME [facility]


   Last Admin: 09/20/21 20:50 Dose:  50 mg


   Documented by: 





Discontinued Medications





Albuterol/Ipratropium (Albuterol/Ipratropium 3.0-0.5 Mg/3 Ml Neb Soln)  3 ml NEB

 Q4HRRT [facility]


   Last Admin: 09/20/21 13:00 Dose:  3 ml


   Documented by: 


Azithromycin (Azithromycin 500 Mg Vial)  500 mg IV Q24H KYLE


Ceftriaxone Sodium (Ceftriaxone 1 Gm Vial)  1 gm IVPUSH ONETIME ONE


   Stop: 09/19/21 15:39


   Last Admin: 09/19/21 16:05 Dose:  1 gm


   Documented by: 


Azithromycin 500 mg/ Sodium (Chloride)  250 mls @ 250 mls/hr IV ONETIME [facility]


   Last Admin: 09/19/21 16:06 Dose:  250 mls/hr


   Documented by: 


Sterile Water (Sterile Water For Injection) Confirm Administered Dose 20 mls @ 

as directed .ROUTE .STK-MED ONE


   Stop: 09/19/21 15:44


   Last Admin: 09/19/21 15:56 Dose:  Not Given


   Documented by: 


Sodium Chloride (Normal Saline)  1,000 mls @ 999 mls/hr IV .Bolus ONE


   Stop: 09/19/21 17:23


   Last Admin: 09/19/21 17:07 Dose:  999 mls/hr


   Documented by: 


Ceftriaxone Sodium 1 gm/ (Sodium Chloride)  50 mls @ 100 mls/hr IV Q24H KYLE


Sodium Chloride (Normal Saline)  1,000 mls @ 125 mls/hr IV [facility]


   Last Admin: 09/19/21 17:09 Dose:  125 mls/hr


   Documented by: 


Sodium Chloride (Normal Saline)  500 mls @ 999 mls/hr IV BOLUS ONE


   Stop: 09/19/21 22:43


   Last Admin: 09/19/21 23:02 Dose:  999 mls/hr


   Documented by: 


Lactated Ringer's (Ringers, Lactated)  1,000 mls @ 125 mls/hr IV [facility]


   Last Admin: 09/21/21 07:35 Dose:  125 mls/hr


   Documented by: 


Metoprolol Tartrate (Metoprolol Tartrate 50 Mg Tab)  50 mg PO ONETIME ONE


   Stop: 09/20/21 23:11


   Last Admin: 09/20/21 23:36 Dose:  50 mg


   Documented by: 


Pneumococcal Polyvalent Vaccine (Pneumococcal 23-Valent Conjugate Vaccine 0.5 Ml

 Syringe)  25 mcg IM .ONCE ONE


   Stop: 09/20/21 09:01


Sterile Water (Water For Injection, Sterile 20 Ml Sdv)  20 ml INJECT ONETIME ONE


   Stop: 09/19/21 15:45


   Last Admin: 09/19/21 16:05 Dose:  20 ml


   Documented by: 











- Patient Data


Lab Results Last 24 hrs: 


                         Laboratory Results - last 24 hr











  09/20/21 09/21/21 09/21/21 Range/Units





  17:26 05:47 05:47 


 


WBC   19.10 H   (4.0-11.0)  K/uL


 


RBC   3.65 L   (4.50-5.90)  M/uL


 


Hgb   11.2 L   (13.0-17.0)  g/dL


 


Hct   32.5 L   (38.0-50.0)  %


 


MCV   89.0   (80.0-98.0)  fL


 


MCH   30.7   (27.0-32.0)  pg


 


MCHC   34.5   (31.0-37.0)  g/dL


 


RDW Std Deviation   44.3   (28.0-62.0)  fl


 


RDW Coeff of Arpit   14   (11.0-15.0)  %


 


Plt Count   269   (150-400)  K/uL


 


MPV   9.80   (7.40-12.00)  fL


 


Neut % (Auto)   82.7 H   (48.0-80.0)  %


 


Lymph % (Auto)   9.2 L   (16.0-40.0)  %


 


Mono % (Auto)   7.1   (0.0-15.0)  %


 


Eos % (Auto)   0.8   (0.0-7.0)  %


 


Baso % (Auto)   0.2   (0.0-1.5)  %


 


Neut # (Auto)   15.8 H   (1.4-5.7)  K/uL


 


Lymph # (Auto)   1.8   (0.6-2.4)  K/uL


 


Mono # (Auto)   1.4 H   (0.0-0.8)  K/uL


 


Eos # (Auto)   0.2   (0.0-0.7)  K/uL


 


Baso # (Auto)   0.0   (0.0-0.1)  K/uL


 


Nucleated RBC %   0.0   /100WBC


 


Nucleated RBCs #   0   K/uL


 


Sodium    Cancelled  


 


Potassium    Cancelled  


 


Chloride    Cancelled  


 


Carbon Dioxide    Cancelled  


 


BUN    Cancelled  


 


Creatinine    Cancelled  


 


Est Cr Clr Drug Dosing    Cancelled  


 


Estimated GFR (MDRD)    Cancelled  


 


Glucose    Cancelled  


 


POC Glucose  174 H    (70-99)  mg/dL


 


Calcium    Cancelled  


 


Total Bilirubin    Cancelled  


 


AST    Cancelled  


 


ALT    Cancelled  


 


Alkaline Phosphatase    Cancelled  


 


Total Protein    Cancelled  


 


Albumin    Cancelled  


 


Globulin    Cancelled  


 


Albumin/Globulin Ratio    Cancelled  














  09/21/21 09/21/21 Range/Units





  06:29 11:36 


 


WBC    (4.0-11.0)  K/uL


 


RBC    (4.50-5.90)  M/uL


 


Hgb    (13.0-17.0)  g/dL


 


Hct    (38.0-50.0)  %


 


MCV    (80.0-98.0)  fL


 


MCH    (27.0-32.0)  pg


 


MCHC    (31.0-37.0)  g/dL


 


RDW Std Deviation    (28.0-62.0)  fl


 


RDW Coeff of Arpit    (11.0-15.0)  %


 


Plt Count    (150-400)  K/uL


 


MPV    (7.40-12.00)  fL


 


Neut % (Auto)    (48.0-80.0)  %


 


Lymph % (Auto)    (16.0-40.0)  %


 


Mono % (Auto)    (0.0-15.0)  %


 


Eos % (Auto)    (0.0-7.0)  %


 


Baso % (Auto)    (0.0-1.5)  %


 


Neut # (Auto)    (1.4-5.7)  K/uL


 


Lymph # (Auto)    (0.6-2.4)  K/uL


 


Mono # (Auto)    (0.0-0.8)  K/uL


 


Eos # (Auto)    (0.0-0.7)  K/uL


 


Baso # (Auto)    (0.0-0.1)  K/uL


 


Nucleated RBC %    /100WBC


 


Nucleated RBCs #    K/uL


 


Sodium    


 


Potassium    


 


Chloride    


 


Carbon Dioxide    


 


BUN    


 


Creatinine    


 


Est Cr Clr Drug Dosing    


 


Estimated GFR (MDRD)    


 


Glucose    


 


POC Glucose  147 H  190 H  (70-99)  mg/dL


 


Calcium    


 


Total Bilirubin    


 


AST    


 


ALT    


 


Alkaline Phosphatase    


 


Total Protein    


 


Albumin    


 


Globulin    


 


Albumin/Globulin Ratio    











Result Diagrams: 


                                 09/21/21 05:47





                                 09/20/21 02:25


[facility] Results Last 24 hrs: 


                                  Microbiology











 09/19/21 16:05 Aerobic Blood Culture - Preliminary





 Blood - Venous - Lab Draw    NO GROWTH AFTER 1 DAY





 Anaerobic Blood Culture - Preliminary





    NO GROWTH AFTER 1 DAY


 


 09/19/21 15:55 Aerobic Blood Culture - Preliminary





 Blood - Venous    NO GROWTH AFTER 1 DAY





 Anaerobic Blood Culture - Final














Sepsis Event Note





- Focused Exam


Vital Signs: 


                                   Vital Signs











  Temp Pulse Pulse Resp BP BP Pulse Ox


 


 09/21/21 12:03  36.4 C    18   147/77 H  95


 


 09/21/21 08:53   65    137/82  


 


 09/21/21 07:41  36.1 C   65  16   137/82  97


 


 09/21/21 05:55  37.2 C   60  18   142/80 H  96














- My Orders


Last 24 Hours: 


My Active Orders





09/20/21 21:42


Diltiazem   20 mg IVPUSH Q4H PRN 





09/20/21 21:44


Codeine/guaiFENesin [Robitussin AC]   10 ml PO Q6H PRN 














- Plan


Plan:: 





I have seen and evaluated the patient and agree with the residents note unless 

specified in my note

## 2021-09-22 LAB
BUN SERPL-MCNC: 20 MG/DL (ref 7–18)
CHLORIDE SERPL-SCNC: 102 MMOL/L (ref 98–107)
CO2 SERPL-SCNC: 20.7 MMOL/L (ref 21–32)
GLUCOSE SERPL-MCNC: 168 MG/DL (ref 74–106)
POTASSIUM SERPL-SCNC: 4.2 MMOL/L (ref 3.5–5.1)
SODIUM SERPL-SCNC: 137 MMOL/L (ref 136–148)

## 2021-09-22 PROCEDURE — 3E0234Z INTRODUCTION OF SERUM, TOXOID AND VACCINE INTO MUSCLE, PERCUTANEOUS APPROACH: ICD-10-PCS | Performed by: INTERNAL MEDICINE

## 2021-09-22 RX ADMIN — GUAIFENESIN AND CODEINE PHOSPHATE PRN ML: 100; 10 SOLUTION ORAL at 06:39

## 2021-09-22 RX ADMIN — SODIUM CHLORIDE PRN MG: 9 INJECTION, SOLUTION INTRAVENOUS at 06:49

## 2021-09-22 RX ADMIN — Medication SCH TAB: at 08:42

## 2021-09-22 NOTE — [id number]
<Devon Good - Last Filed: 09/22/21 15:06>





**Discharge Summary





- Hospital Course


Free Text/Narrative:: 


The patient is a 66-year-old  male with a significant medical history 

of hypertension, diabetes, and a right nephrectomy who was admitted for 

community-acquired pneumonia.  On hospital admission he was also found to have 

kidney disease with increased creatinine and BUN, as well as atrial fibrillation

on telemetry on multiple occasions which would convert back to sinus rhythm.  

For his community-acquired pneumonia he was placed on 1 g of ceftriaxone IV 

every 24 hours and 500 mg of azithromycin IV every 24 hours.  His white blood 

cell count was initially elevated, was in the high 20s and then dropped to 19.10

yesterday, and was 13.01 today.  Based on this improvement, the patient's IV 

antibiotic regimen could now progress to oral treatment.  He will be discharged 

on levofloxacin 750 mg per oral route taken once daily, and will be provided 7 

pills.  In regards to his kidney disease, his creatinine was well above his 

baseline but with intravenous fluids has decreased to 1.9.  For his atrial 

fibrillation, the patient has received multiple prescriptions including 

diltiazem 125 mg per oral route daily, Eliquis 5 mg p.o. twice a day, and 

metoprolol succinate 100 mg p.o. taken once daily.  The patient is originally 

from [place] and will be returning this Saturday; he will see his PCP in [place] 

in 1 to 2 weeks.  Once at his PCP he will request a cardiology follow-up in reg

ards to his atrial fibrillation, receiving an echocardiogram, and further 

cardiac care.  For his other significant medical history including back pain and

hypertension, the patient will have his medication and management options 

reviewed by his PCP in [place].








- Discharge Data


Discharge Date: 09/22/21


Discharge Disposition: Home, Self-Care 01


Condition: Stable





- Referral to Home Health


Primary Care Physician: 


PCP Not In Area








- Discharge Diagnosis/Problem(s)


(1) Back pain of thoracolumbar region


SNOMED Code(s): 667123481


   ICD Code: M54.5 - LOW BACK PAIN; M54.6 - PAIN IN THORACIC SPINE   Status: 

Acute   





(2) Kidney disease


SNOMED Code(s): 83191206


   ICD Code: N28.9 - DISORDER OF KIDNEY AND URETER, UNSPECIFIED   Status: Acute 

 





(3) Pneumonia


SNOMED Code(s): 711632623


   ICD Code: J18.9 - PNEUMONIA, UNSPECIFIED ORGANISM   Status: Acute   





(4) Paroxysmal A-fib


SNOMED Code(s): 424836921


   ICD Code: I48.0 - PAROXYSMAL ATRIAL FIBRILLATION   Status: Acute   





- Patient Instructions


Diet: Heart Healthy Diet


Activity: As Tolerated


Driving: [address] Today


Showering/Bathing: May Shower


Notify Provider of: Fever, Increased Pain, Swelling and Redness, Drainage, 

Nausea and/or Vomiting





- Discharge Plan


*PRESCRIPTION DRUG MONITORING PROGRAM REVIEWED*: Not Applicable


*COPY OF PRESCRIPTION DRUG MONITORING REPORT IN PATIENT AMANUEL: Not Applicable


Prescriptions/Med Rec: 


Diltiazem [Cardizem CD] 120 mg PO DAILY #30 cap.er


Apixaban [Eliquis] 5 mg PO BID #60 tablet


levoFLOXacin [Levaquin] 750 mg PO DAILY #7 tab


Codeine/guaiFENesin [Robitussin AC] 10 ml PO Q6H PRN #118 ml


 PRN Reason: Cough


Metoprolol Succinate [Toprol XL 100mg] 100 mg PO DAILY #30 tab.er


Home Medications: 


                                    Home Meds





Cholecalciferol (Vitamin D3) [Vitamin D3] 1 dose PO DAILY 09/19/21 [History]


Citalopram [Citalopram HBr] 20 mg PO DAILY 09/19/21 [History]


Dulaglutide [Trulicity] 3 mg PO DAILY 09/19/21 [History]


Lactobacillus Acidophilus [Acidophilus Probiotic] 1 dose PO DAILY 09/19/21 

[History]


Melatonin 10 mg PO BEDTIME 09/19/21 [History]


Omeprazole Magnesium 20 mg PO DAILY 09/19/21 [History]


atorvaSTATin [Lipitor] 20 mg PO DAILY 09/19/21 [History]


glipiZIDE [Glucotrol XL] 10 mg PO DAILY 09/19/21 [History]


lisinopriL [Lisinopril] 2.5 mg PO DAILY 09/19/21 [History]


traZODone 50 mg PO BEDTIME 09/19/21 [History]


Buprenorphine 1 patch TOP WEEKLY PRN 09/20/21 [History]


Buprenorphine HCl 1 tab .ROUTE BID PRN 09/20/21 [History]


Apixaban [Eliquis] 5 mg PO BID #60 tablet 09/22/21 [Rx]


Diltiazem [Cardizem CD] 120 mg PO DAILY #30 cap.er 09/22/21 [Rx]


Metoprolol Succinate [Toprol XL 100mg] 100 mg PO DAILY #30 tab.er 09/22/21 [Rx]


levoFLOXacin [Levaquin] 750 mg PO DAILY #7 tab 09/22/21 [Rx]


Codeine/guaiFENesin [Robitussin AC] 10 ml PO Q6H PRN #118 ml 09/23/21 [Rx]








Oxygen Therapy Mode: Room Air


Patient Handouts:  Diltiazem Extended-Release Oral Capsules or Tablets, 

Metoprolol Extended-Release Tablets, Levofloxacin tablets, Apixaban oral 

tablets, Community-Acquired Pneumonia, Adult, Easy-to-Read





- Discharge Summary/Plan Comment


DC Time >30 min.: Yes


Total # of Minutes for Discharge Time: 





35 minutes





- Review of Systems


General: Denies: Fever, Weakness, Fatigue


Pulmonary: Denies: Shortness of Breath, Cough


Cardiovascular: Denies: Chest Pain, Palpitations, Dyspnea on Exertion


Gastrointestinal: Denies: Abdominal Pain, Constipation, Diarrhea


Genitourinary: Denies: Dysuria





- Patient Data


Vitals - Most Recent: 


                                Last Vital Signs











Temp  96.7 F L  09/22/21 11:58


 


Pulse  73   09/22/21 11:58


 


Resp  22 H  09/22/21 11:58


 


BP  135/80   09/22/21 11:58


 


Pulse Ox  96   09/22/21 11:58











Weight - Most Recent: 104.236 kg


I&O - Last 24 hours: 


                                 Intake & Output











 09/22/21 09/22/21 09/22/21





 06:59 14:59 22:59


 


Intake Total 1150  


 


Output Total 750  


 


Balance 400  











Lab Results - Last 24 hrs: 


                         Laboratory Results - last 24 hr











  09/21/21 09/21/21 09/22/21 Range/Units





  05:47 17:01 05:10 


 


WBC    13.01 H  (4.0-11.0)  K/uL


 


RBC    4.04 L  (4.50-5.90)  M/uL


 


Hgb    12.5 L  (13.0-17.0)  g/dL


 


Hct    35.8 L  (38.0-50.0)  %


 


MCV    88.6  (80.0-98.0)  fL


 


MCH    30.9  (27.0-32.0)  pg


 


MCHC    34.9  (31.0-37.0)  g/dL


 


RDW Std Deviation    43.7  (28.0-62.0)  fl


 


RDW Coeff of Arpit    13  (11.0-15.0)  %


 


Plt Count    309  (150-400)  K/uL


 


MPV    9.70  (7.40-12.00)  fL


 


Neut % (Auto)    73.7  (48.0-80.0)  %


 


Lymph % (Auto)    14.5 L  (16.0-40.0)  %


 


Mono % (Auto)    8.0  (0.0-15.0)  %


 


Eos % (Auto)    3.6  (0.0-7.0)  %


 


Baso % (Auto)    0.2  (0.0-1.5)  %


 


Neut # (Auto)    9.6 H  (1.4-5.7)  K/uL


 


Lymph # (Auto)    1.9  (0.6-2.4)  K/uL


 


Mono # (Auto)    1.0 H  (0.0-0.8)  K/uL


 


Eos # (Auto)    0.5  (0.0-0.7)  K/uL


 


Baso # (Auto)    0.0  (0.0-0.1)  K/uL


 


Nucleated RBC %    0.0  /100WBC


 


Nucleated RBCs #    0  K/uL


 


Sodium  137    (136-148)  mmol/L


 


Potassium  4.4    (3.5-5.1)  mmol/L


 


Chloride  103    ()  mmol/L


 


Carbon Dioxide  22.0    (21.0-32.0)  mmol/L


 


BUN  23 H    (7.0-18.0)  mg/dL


 


Creatinine  2.0 H    (0.8-1.3)  mg/dL


 


Est Cr Clr Drug Dosing  38.02    mL/min


 


Estimated GFR (MDRD)  33.7    ml/min


 


Glucose  145 H    ()  mg/dL


 


POC Glucose   163 H   (70-99)  mg/dL


 


Calcium  9.1    (8.5-10.1)  mg/dL


 


Total Bilirubin  0.6    (0.2-1.0)  mg/dL


 


AST  17    (15-37)  IU/L


 


ALT  20    (14-63)  IU/L


 


Alkaline Phosphatase  60    ()  U/L


 


Total Protein  6.9    (6.4-8.2)  g/dL


 


Albumin  2.9 L    (3.4-5.0)  g/dL


 


Globulin  4.0    (2.6-4.0)  g/dL


 


Albumin/Globulin Ratio  0.7 L    (0.9-1.6)  














  09/22/21 09/22/21 09/22/21 Range/Units





  05:10 06:36 11:31 


 


WBC     (4.0-11.0)  K/uL


 


RBC     (4.50-5.90)  M/uL


 


Hgb     (13.0-17.0)  g/dL


 


Hct     (38.0-50.0)  %


 


MCV     (80.0-98.0)  fL


 


MCH     (27.0-32.0)  pg


 


MCHC     (31.0-37.0)  g/dL


 


RDW Std Deviation     (28.0-62.0)  fl


 


RDW Coeff of Arpit     (11.0-15.0)  %


 


Plt Count     (150-400)  K/uL


 


MPV     (7.40-12.00)  fL


 


Neut % (Auto)     (48.0-80.0)  %


 


Lymph % (Auto)     (16.0-40.0)  %


 


Mono % (Auto)     (0.0-15.0)  %


 


Eos % (Auto)     (0.0-7.0)  %


 


Baso % (Auto)     (0.0-1.5)  %


 


Neut # (Auto)     (1.4-5.7)  K/uL


 


Lymph # (Auto)     (0.6-2.4)  K/uL


 


Mono # (Auto)     (0.0-0.8)  K/uL


 


Eos # (Auto)     (0.0-0.7)  K/uL


 


Baso # (Auto)     (0.0-0.1)  K/uL


 


Nucleated RBC %     /100WBC


 


Nucleated RBCs #     K/uL


 


Sodium  137    (136-148)  mmol/L


 


Potassium  4.2    (3.5-5.1)  mmol/L


 


Chloride  102    ()  mmol/L


 


Carbon Dioxide  20.7 L    (21.0-32.0)  mmol/L


 


BUN  20 H    (7.0-18.0)  mg/dL


 


Creatinine  1.9 H    (0.8-1.3)  mg/dL


 


Est Cr Clr Drug Dosing  40.02    mL/min


 


Estimated GFR (MDRD)  35.8    ml/min


 


Glucose  168 H    ()  mg/dL


 


POC Glucose   170 H  205 H  (70-99)  mg/dL


 


Calcium  9.3    (8.5-10.1)  mg/dL


 


Total Bilirubin  0.5    (0.2-1.0)  mg/dL


 


AST  13 L    (15-37)  IU/L


 


ALT  22    (14-63)  IU/L


 


Alkaline Phosphatase  72    ()  U/L


 


Total Protein  7.3    (6.4-8.2)  g/dL


 


Albumin  3.0 L    (3.4-5.0)  g/dL


 


Globulin  4.3 H    (2.6-4.0)  g/dL


 


Albumin/Globulin Ratio  0.7 L    (0.9-1.6)  











DOROTEO Results - Last 24 hrs: 


                                  Microbiology











 09/19/21 16:05 Aerobic Blood Culture - Preliminary





 Blood - Venous - Lab Draw    NO GROWTH AFTER 2 DAYS





 Anaerobic Blood Culture - Preliminary





    NO GROWTH AFTER 2 DAYS


 


 09/19/21 15:55 Aerobic Blood Culture - Preliminary





 Blood - Venous    NO GROWTH AFTER 2 DAYS





 Anaerobic Blood Culture - Final











Med Orders - Current: 


                               Current Medications





Acetaminophen (Acetaminophen 325 Mg Tab)  325 mg PO Q6H PRN


   PRN Reason: Pain


   Last Admin: 09/21/21 19:48 Dose:  325 mg


   Documented by: 


Acidophilus/Pectin (Acidophilus With Citrus Pectin Tab)  1 tab PO DAILY [facility]


   Last Admin: 09/22/21 08:42 Dose:  1 tab


   Documented by: 


Apixaban (Apixaban 5 Mg Tab)  5 mg PO BID [facility]


   Last Admin: 09/22/21 08:42 Dose:  5 mg


   Documented by: 


Atorvastatin Calcium (Atorvastatin 20 Mg Tab)  20 mg PO DAILY [facility]


   Last Admin: 09/22/21 08:42 Dose:  20 mg


   Documented by: 


Citalopram Hydrobromide (Citalopram 20 Mg Tab)  20 mg PO DAILY [facility]


   Last Admin: 09/22/21 08:42 Dose:  20 mg


   Documented by: 


Dextrose/Water (50% Dextrose In Water 50 Ml Syringe)  50 ml IVPUSH ASDIRECTED 

PRN


   PRN Reason: Hypoglycemia


Diltiazem HCl (Diltiazem 25 Mg/5 Ml Sdv)  20 mg IVPUSH Q4H PRN


   PRN Reason: Tachycardia


   Last Admin: 09/22/21 06:49 Dose:  20 mg


   Documented by: 


Glucagon (Glucagon,Human Recombinant 1 Mg Vial)  1 mg IM ASDIRECTED PRN


   PRN Reason: Hypoglycemia


Guaifenesin/Codeine Phosphate (Codeine/Guaifenesin  Mg/5 Ml Syrup 5 Ml 

Cup)  10 ml PO Q6H PRN


   PRN Reason: Cough


   Last Admin: 09/22/21 06:39 Dose:  10 ml


   Documented by: 


Sodium Chloride (Normal Saline)  1,000 mls @ 1,000 mls/hr IV ASDIRECTED [facility]


   Last Infusion: 09/19/21 14:40 Dose:  Infused


   Documented by: 


Ceftriaxone Sodium/Dextrose (Rocephin In Dextrose,Iso-Osm 1 Gm/50 Ml)  50 mls @ 

100 mls/hr IV Q24H [facility]


   Last Admin: 09/21/21 14:57 Dose:  100 mls/hr


   Documented by: 


Azithromycin 500 mg/ Sodium (Chloride)  250 mls @ 250 mls/hr IV Q24H [facility]


   Last Admin: 09/21/21 15:53 Dose:  250 mls/hr


   Documented by: 


Insulin Aspart (Insulin Aspart 100 Units/Ml 3 Ml Pen)  0 unit SUBCUT TIDAC [facility]; 

Protocol


   Last Admin: 09/22/21 12:15 Dose:  2 unit


   Documented by: 


Ipratropium Bromide (Ipratropium 0.02% 0.5 Mg/2.5 Ml Neb Soln)  0.5 mg NEB 

Q6HRRT PRN


   PRN Reason: wheezing/dyspnea


Melatonin (Melatonin 3 Mg Tab)  9 mg PO BEDTIME [facility]


   Last Admin: 09/21/21 20:30 Dose:  9 mg


   Documented by: 


Metoprolol Succinate (Metoprolol Succinate 100 Mg Tab.Er)  100 mg PO DAILY [facility]


   Last Admin: 09/22/21 08:42 Dose:  100 mg


   Documented by: 


Morphine Sulfate (Morphine 2 Mg/Ml Syringe)  1 mg IVPUSH Q4H PRN


   PRN Reason: Pain


   Last Admin: 09/22/21 10:23 Dose:  1 mg


   Documented by: 


Trazodone HCl (Trazodone 50 Mg Tab)  50 mg PO BEDTIME [facility]


   Last Admin: 09/21/21 20:30 Dose:  50 mg


   Documented by: 





Discontinued Medications





Albuterol/Ipratropium (Albuterol/Ipratropium 3.0-0.5 Mg/3 Ml Neb Soln)  3 ml NEB

 Q4HRRT [facility]


   Last Admin: 09/20/21 13:00 Dose:  3 ml


   Documented by: 


Azithromycin (Azithromycin 500 Mg Vial)  500 mg IV Q24H [facility]


Ceftriaxone Sodium (Ceftriaxone 1 Gm Vial)  1 gm IVPUSH ONETIME ONE


   Stop: 09/19/21 15:39


   Last Admin: 09/19/21 16:05 Dose:  1 gm


   Documented by: 


Heparin Sodium (Porcine) (Heparin Sodium 5,000 Units/Ml Vial)  5,000 units 

SUBCUT Q12H [facility]


   Last Admin: 09/21/21 21:39 Dose:  Not Given


   Documented by: 


Azithromycin 500 mg/ Sodium (Chloride)  250 mls @ 250 mls/hr IV ONETIME [facility]


   Last Admin: 09/19/21 16:06 Dose:  250 mls/hr


   Documented by: 


Sterile Water (Sterile Water For Injection) Confirm Administered Dose 20 mls @ 

as directed .ROUTE .STK-MED ONE


   Stop: 09/19/21 15:44


   Last Admin: 09/19/21 15:56 Dose:  Not Given


   Documented by: 


Sodium Chloride (Normal Saline)  1,000 mls @ 999 mls/hr IV .Bolus ONE


   Stop: 09/19/21 17:23


   Last Admin: 09/19/21 17:07 Dose:  999 mls/hr


   Documented by: 


Ceftriaxone Sodium 1 gm/ (Sodium Chloride)  50 mls @ 100 mls/hr IV Q24H [facility]


Sodium Chloride (Normal Saline)  1,000 mls @ 125 mls/hr IV ASDIRECTED [facility]


   Last Admin: 09/19/21 17:09 Dose:  125 mls/hr


   Documented by: 


Sodium Chloride (Normal Saline)  500 mls @ 999 mls/hr IV BOLUS ONE


   Stop: 09/19/21 22:43


   Last Admin: 09/19/21 23:02 Dose:  999 mls/hr


   Documented by: 


Lactated Ringer's (Ringers, Lactated)  1,000 mls @ 125 mls/hr IV ASDIRECTED [facility]


   Last Admin: 09/21/21 07:35 Dose:  125 mls/hr


   Documented by: 


Metoprolol Succinate (Metoprolol Succinate 50 Mg Tab.Er)  50 mg PO DAILY [facility]


   Last Admin: 09/21/21 08:53 Dose:  50 mg


   Documented by: 


Metoprolol Tartrate (Metoprolol Tartrate 50 Mg Tab)  50 mg PO ONETIME ONE


   Stop: 09/20/21 23:11


   Last Admin: 09/20/21 23:36 Dose:  50 mg


   Documented by: 


Metoprolol Tartrate (Metoprolol Tartrate 50 Mg Tab)  50 mg PO ONETIME ONE


   Stop: 09/21/21 21:37


   Last Admin: 09/21/21 21:48 Dose:  50 mg


   Documented by: 


Pneumococcal Polyvalent Vaccine (Pneumococcal Polyvalent-23 Vaccine 0.5 Ml Sdv) 

 0.5 ml IM .ONCE ONE


   Stop: 09/22/21 11:01


   Last Admin: 09/22/21 11:12 Dose:  0.5 ml


   Documented by: 


Sterile Water (Water For Injection, Sterile 20 Ml Sdv)  20 ml INJECT ONETIME ONE


   Stop: 09/19/21 15:45


   Last Admin: 09/19/21 16:05 Dose:  20 ml


   Documented by: 











- Exam


General: Reports: Alert, Oriented, Cooperative, No Acute Distress


Lungs: Reports: Clear to Auscultation, Normal Respiratory Effort


Cardiovascular: Reports: Regular Rate, Regular Rhythm, No Murmurs


GI/Abdominal Exam: Normal Bowel Sounds, Soft, Non-Tender, No Organomegaly





<Artemio Garner - Last Filed: 09/24/21 12:14>





**Discharge Summary





- Hospital Course


Free Text/Narrative:: 





I have seen and evaluated the patient and agree with the residents note unless 

specified in my note











- Referral to Home Health


Primary Care Physician: 


PCP Not In Area








- Patient Data


Vitals - Most Recent: 


                                Last Vital Signs











Temp  35.9 C L  09/22/21 11:58


 


Pulse  73   09/22/21 11:58


 


Resp  22 H  09/22/21 11:58


 


BP  135/80   09/22/21 11:58


 


Pulse Ox  96   09/22/21 11:58











DOROTEO Results - Last 24 hrs: 


                                  Microbiology











 09/19/21 16:05 Aerobic Blood Culture - Preliminary





 Blood - Venous - Lab Draw    NO GROWTH AFTER 4 DAYS





 Anaerobic Blood Culture - Preliminary





    NO GROWTH AFTER 4 DAYS


 


 09/19/21 15:55 Aerobic Blood Culture - Preliminary





 Blood - Venous    NO GROWTH AFTER 4 DAYS





 Anaerobic Blood Culture - Final











Med Orders - Current: 


                               Current Medications








Discontinued Medications





Acetaminophen (Acetaminophen 325 Mg Tab)  325 mg PO Q6H PRN


   PRN Reason: Pain


   Last Admin: 09/21/21 19:48 Dose:  325 mg


   Documented by: 


Acidophilus/Pectin (Acidophilus With Citrus Pectin Tab)  1 tab PO DAILY [facility]


   Last Admin: 09/22/21 08:42 Dose:  1 tab


   Documented by: 


Albuterol/Ipratropium (Albuterol/Ipratropium 3.0-0.5 Mg/3 Ml Neb Soln)  3 ml NEB

 Q4HRRT [facility]


   Last Admin: 09/20/21 13:00 Dose:  3 ml


   Documented by: 


Apixaban (Apixaban 5 Mg Tab)  5 mg PO BID [facility]


   Last Admin: 09/22/21 08:42 Dose:  5 mg


   Documented by: 


Atorvastatin Calcium (Atorvastatin 20 Mg Tab)  20 mg PO DAILY [facility]


   Last Admin: 09/22/21 08:42 Dose:  20 mg


   Documented by: 


Azithromycin (Azithromycin 500 Mg Vial)  500 mg IV Q24H [facility]


Ceftriaxone Sodium (Ceftriaxone 1 Gm Vial)  1 gm IVPUSH ONETIME ONE


   Stop: 09/19/21 15:39


   Last Admin: 09/19/21 16:05 Dose:  1 gm


   Documented by: 


Citalopram Hydrobromide (Citalopram 20 Mg Tab)  20 mg PO DAILY [facility]


   Last Admin: 09/22/21 08:42 Dose:  20 mg


   Documented by: 


Dextrose/Water (50% Dextrose In Water 50 Ml Syringe)  50 ml IVPUSH ASDIRECTED 

PRN


   PRN Reason: Hypoglycemia


Diltiazem HCl (Diltiazem 25 Mg/5 Ml Sdv)  20 mg IVPUSH Q4H PRN


   PRN Reason: Tachycardia


   Last Admin: 09/22/21 06:49 Dose:  20 mg


   Documented by: 


Glucagon (Glucagon,Human Recombinant 1 Mg Vial)  1 mg IM ASDIRECTED PRN


   PRN Reason: Hypoglycemia


Guaifenesin/Codeine Phosphate (Codeine/Guaifenesin  Mg/5 Ml Syrup 5 Ml 

Cup)  10 ml PO Q6H PRN


   PRN Reason: Cough


   Last Admin: 09/22/21 06:39 Dose:  10 ml


   Documented by: 


Heparin Sodium (Porcine) (Heparin Sodium 5,000 Units/Ml Vial)  5,000 units 

SUBCUT Q12H [facility]


   Last Admin: 09/21/21 21:39 Dose:  Not Given


   Documented by: 


Sodium Chloride (Normal Saline)  1,000 mls @ 1,000 mls/hr IV ASDIRECTED [facility]


   Last Infusion: 09/19/21 14:40 Dose:  Infused


   Documented by: 


Azithromycin 500 mg/ Sodium (Chloride)  250 mls @ 250 mls/hr IV ONETIME [facility]


   Last Admin: 09/19/21 16:06 Dose:  250 mls/hr


   Documented by: 


Sterile Water (Sterile Water For Injection) Confirm Administered Dose 20 mls @ 

as directed .ROUTE .STK-MED ONE


   Stop: 09/19/21 15:44


   Last Admin: 09/19/21 15:56 Dose:  Not Given


   Documented by: 


Sodium Chloride (Normal Saline)  1,000 mls @ 999 mls/hr IV .Bolus ONE


   Stop: 09/19/21 17:23


   Last Admin: 09/19/21 17:07 Dose:  999 mls/hr


   Documented by: 


Ceftriaxone Sodium 1 gm/ (Sodium Chloride)  50 mls @ 100 mls/hr IV Q24H [facility]


Sodium Chloride (Normal Saline)  1,000 mls @ 125 mls/hr IV ASDIRECTED [facility]


   Last Admin: 09/19/21 17:09 Dose:  125 mls/hr


   Documented by: 


Ceftriaxone Sodium/Dextrose (Rocephin In Dextrose,Iso-Osm 1 Gm/50 Ml)  50 mls @ 

100 mls/hr IV Q24H [facility]


   Last Admin: 09/21/21 14:57 Dose:  100 mls/hr


   Documented by: 


Azithromycin 500 mg/ Sodium (Chloride)  250 mls @ 250 mls/hr IV Q24H [facility]


   Last Admin: 09/21/21 15:53 Dose:  250 mls/hr


   Documented by: 


Sodium Chloride (Normal Saline)  500 mls @ 999 mls/hr IV BOLUS ONE


   Stop: 09/19/21 22:43


   Last Admin: 09/19/21 23:02 Dose:  999 mls/hr


   Documented by: 


Lactated Ringer's (Ringers, Lactated)  1,000 mls @ 125 mls/hr IV ASDIRECTED [facility]


   Last Admin: 09/21/21 07:35 Dose:  125 mls/hr


   Documented by: 


Insulin Aspart (Insulin Aspart 100 Units/Ml 3 Ml Pen)  0 unit SUBCUT TIDAC [facility]; 

Protocol


   Last Admin: 09/22/21 12:15 Dose:  2 unit


   Documented by: 


Ipratropium Bromide (Ipratropium 0.02% 0.5 Mg/2.5 Ml Neb Soln)  0.5 mg NEB 

Q6HRRT PRN


   PRN Reason: wheezing/dyspnea


Melatonin (Melatonin 3 Mg Tab)  9 mg PO BEDTIME [facility]


   Last Admin: 09/21/21 20:30 Dose:  9 mg


   Documented by: 


Metoprolol Succinate (Metoprolol Succinate 50 Mg Tab.Er)  50 mg PO DAILY [facility]


   Last Admin: 09/21/21 08:53 Dose:  50 mg


   Documented by: 


Metoprolol Succinate (Metoprolol Succinate 100 Mg Tab.Er)  100 mg PO DAILY [facility]


   Last Admin: 09/22/21 08:42 Dose:  100 mg


   Documented by: 


Metoprolol Tartrate (Metoprolol Tartrate 50 Mg Tab)  50 mg PO ONETIME ONE


   Stop: 09/20/21 23:11


   Last Admin: 09/20/21 23:36 Dose:  50 mg


   Documented by: 


Metoprolol Tartrate (Metoprolol Tartrate 50 Mg Tab)  50 mg PO ONETIME ONE


   Stop: 09/21/21 21:37


   Last Admin: 09/21/21 21:48 Dose:  50 mg


   Documented by: 


Morphine Sulfate (Morphine 2 Mg/Ml Syringe)  1 mg IVPUSH Q4H PRN


   PRN Reason: Pain


   Last Admin: 09/22/21 10:23 Dose:  1 mg


   Documented by: 


Pneumococcal Polyvalent Vaccine (Pneumococcal Polyvalent-23 Vaccine 0.5 Ml Sdv) 

 0.5 ml IM .ONCE ONE


   Stop: 09/22/21 11:01


   Last Admin: 09/22/21 11:12 Dose:  0.5 ml


   Documented by: 


Sterile Water (Water For Injection, Sterile 20 Ml Sdv)  20 ml INJECT ONETIME ONE


   Stop: 09/19/21 15:45


   Last Admin: 09/19/21 16:05 Dose:  20 ml


   Documented by: 


Trazodone HCl (Trazodone 50 Mg Tab)  50 mg PO BEDTIME [facility]


   Last Admin: 09/21/21 20:30 Dose:  50 mg


   Documented by:

## 2021-09-23 NOTE — PCM.SN.2
<FlorentinoDevon - Last Filed: 09/23/21 19:07>





- Free Text/Narrative


Note: 


The patients wife called today with concerns that her  was in "active 

Afib" and that he had an increased heart rate. After calling the patient on two 

separate occasions, he called back and I had a conversation with him. I asked 

him about his heart rate, for which he recorded two values of 77 and 82. I asked

about chest pain and palpitations, which the patient denied. I informed the 

patient that if he has chest pain, palpitations, shortness of breath, or 

increased heart rate above 110, to go to the nearest ER to be worked up. 





There was also a concern over cough syrup and I asked the patient if he would 

like a prescription sent to his nearest pharmacy, and that the cough syrup comes

in formulations that are prescription and nonprescription (Robitussin with 

codeine vs non-codeine). The patient insisted that he was coughing less and that

if needed he would go to the pharmacy to purchase the nonprescription type.








Time Documentation











<Artemio Garner - Last Filed: 09/24/21 12:06>





- Free Text/Narrative


Note: 


I have seen and evaluated the patient and agree with the residents note unless 

specified in my note














Time Documentation

## 2021-09-24 NOTE — ECHO
EXAM DATE: 09/19/21



PATIENT'S AGE: 66



The ECHO report has been scanned into [facility] and can be seen in this patient's
EMR (Electronic Medical Record) under the REPORTS section. The report has also 
been scanned into PACS.



MARIMAR